# Patient Record
Sex: FEMALE | Race: WHITE | NOT HISPANIC OR LATINO | Employment: OTHER | URBAN - METROPOLITAN AREA
[De-identification: names, ages, dates, MRNs, and addresses within clinical notes are randomized per-mention and may not be internally consistent; named-entity substitution may affect disease eponyms.]

---

## 2018-02-18 ENCOUNTER — HOSPITAL ENCOUNTER (EMERGENCY)
Facility: HOSPITAL | Age: 48
Discharge: HOME | End: 2018-02-18
Attending: EMERGENCY MEDICINE | Admitting: EMERGENCY MEDICINE

## 2018-02-18 ENCOUNTER — APPOINTMENT (OUTPATIENT)
Dept: GENERAL RADIOLOGY | Facility: HOSPITAL | Age: 48
End: 2018-02-18

## 2018-02-18 VITALS
BODY MASS INDEX: 29.06 KG/M2 | HEIGHT: 63 IN | WEIGHT: 164 LBS | DIASTOLIC BLOOD PRESSURE: 71 MMHG | SYSTOLIC BLOOD PRESSURE: 97 MMHG | RESPIRATION RATE: 18 BRPM | OXYGEN SATURATION: 98 % | TEMPERATURE: 97.9 F | HEART RATE: 90 BPM

## 2018-02-18 DIAGNOSIS — J11.1 INFLUENZA: Primary | ICD-10-CM

## 2018-02-18 LAB
FLUAV AG NPH QL: NEGATIVE
FLUBV AG NPH QL IA: NEGATIVE

## 2018-02-18 PROCEDURE — 71046 X-RAY EXAM CHEST 2 VIEWS: CPT

## 2018-02-18 PROCEDURE — 99283 EMERGENCY DEPT VISIT LOW MDM: CPT

## 2018-02-18 PROCEDURE — 87804 INFLUENZA ASSAY W/OPTIC: CPT | Performed by: EMERGENCY MEDICINE

## 2018-02-18 RX ORDER — OSELTAMIVIR PHOSPHATE 75 MG/1
75 CAPSULE ORAL 2 TIMES DAILY
Qty: 10 CAPSULE | Refills: 0 | OUTPATIENT
Start: 2018-02-18 | End: 2019-04-28 | Stop reason: HOSPADM

## 2018-02-18 RX ORDER — DEXTROMETHORPHAN HYDROBROMIDE AND PROMETHAZINE HYDROCHLORIDE 15; 6.25 MG/5ML; MG/5ML
5 SYRUP ORAL 3 TIMES DAILY PRN
Qty: 60 ML | Refills: 0 | OUTPATIENT
Start: 2018-02-18 | End: 2019-04-28 | Stop reason: HOSPADM

## 2018-02-18 NOTE — ED PROVIDER NOTES
Subjective   Patient is a 48 y.o. female presenting with URI.   URI   Presenting symptoms: congestion, cough and fever    Severity:  Moderate  Onset quality:  Gradual  Duration:  1 day  Timing:  Constant  Progression:  Unchanged  Chronicity:  New  Relieved by:  Nothing  Worsened by:  Nothing  Ineffective treatments:  None tried  Associated symptoms: sinus pain    Risk factors: sick contacts        Review of Systems   Constitutional: Positive for fever.   HENT: Positive for congestion and sinus pain.    Respiratory: Positive for cough.    All other systems reviewed and are negative.      Past Medical History:   Diagnosis Date   • Cirrhosis    • Diabetes mellitus    • Disease of thyroid gland        Allergies   Allergen Reactions   • Penicillins Nausea And Vomiting   • Phenergan [Promethazine Hcl] Nausea And Vomiting   • Tramadol Hcl Nausea And Vomiting       Past Surgical History:   Procedure Laterality Date   • COLONOSCOPY     • HYSTERECTOMY         History reviewed. No pertinent family history.    Social History     Social History   • Marital status:      Spouse name: N/A   • Number of children: N/A   • Years of education: N/A     Social History Main Topics   • Smoking status: Current Every Day Smoker     Packs/day: 0.50     Types: Cigarettes   • Smokeless tobacco: Never Used   • Alcohol use No   • Drug use: No   • Sexual activity: Defer     Other Topics Concern   • None     Social History Narrative   • None           Objective   Physical Exam   Constitutional: She is oriented to person, place, and time. She appears well-developed and well-nourished.   HENT:   Head: Normocephalic and atraumatic.   Right Ear: External ear normal.   Left Ear: External ear normal.   Nose: Nose normal.   Mouth/Throat: Oropharynx is clear and moist.   Eyes: Conjunctivae and EOM are normal. Pupils are equal, round, and reactive to light.   Neck: Normal range of motion. Neck supple.   Cardiovascular: Normal rate, regular rhythm,  normal heart sounds and intact distal pulses.    Pulmonary/Chest: Effort normal and breath sounds normal.   Abdominal: Soft. Bowel sounds are normal.   Musculoskeletal: Normal range of motion.   Neurological: She is alert and oriented to person, place, and time.   Skin: Skin is warm and dry.   Psychiatric: She has a normal mood and affect. Her behavior is normal. Judgment and thought content normal.       Procedures         ED Course  ED Course   Comment By Time   Neg cxr. Well aware of the ss of worsening condition. All thankful and agreeable. DAVID Yanes 02/18 1622                  [facility]    Final diagnoses:   Influenza            DAVID Yanes  02/18/18 [id number]

## 2019-04-27 ENCOUNTER — APPOINTMENT (OUTPATIENT)
Dept: CT IMAGING | Facility: HOSPITAL | Age: 49
End: 2019-04-27

## 2019-04-27 ENCOUNTER — APPOINTMENT (OUTPATIENT)
Dept: GENERAL RADIOLOGY | Facility: HOSPITAL | Age: 49
End: 2019-04-27

## 2019-04-27 ENCOUNTER — HOSPITAL ENCOUNTER (EMERGENCY)
Facility: HOSPITAL | Age: 49
Discharge: HOME | End: 2019-04-28
Attending: EMERGENCY MEDICINE | Admitting: EMERGENCY MEDICINE

## 2019-04-27 DIAGNOSIS — K80.20 CALCULUS OF GALLBLADDER WITHOUT CHOLECYSTITIS WITHOUT OBSTRUCTION: ICD-10-CM

## 2019-04-27 DIAGNOSIS — R73.9 HYPERGLYCEMIA: ICD-10-CM

## 2019-04-27 DIAGNOSIS — R07.9 CHEST PAIN, UNSPECIFIED TYPE: ICD-10-CM

## 2019-04-27 DIAGNOSIS — R10.13 EPIGASTRIC PAIN: Primary | ICD-10-CM

## 2019-04-27 DIAGNOSIS — K74.60 CIRRHOSIS OF LIVER WITHOUT ASCITES, UNSPECIFIED HEPATIC CIRRHOSIS TYPE (HCC): ICD-10-CM

## 2019-04-27 LAB
ALBUMIN SERPL-MCNC: 4.5 G/DL (ref 3.5–5.2)
ALBUMIN/GLOB SERPL: 1.2 G/DL
ALP SERPL-CCNC: 144 U/L (ref 39–117)
ALT SERPL W P-5'-P-CCNC: 31 U/L (ref 1–33)
ANION GAP SERPL CALCULATED.3IONS-SCNC: 13 MMOL/L
AST SERPL-CCNC: 36 U/L (ref 1–32)
B-OH-BUTYR SERPL-SCNC: 0.12 MMOL/L (ref 0.02–0.27)
BACTERIA UR QL AUTO: NORMAL /HPF
BASOPHILS # BLD AUTO: 0.01 10*3/MM3 (ref 0–0.2)
BASOPHILS NFR BLD AUTO: 0.3 % (ref 0–1.5)
BILIRUB SERPL-MCNC: 0.7 MG/DL (ref 0.2–1.2)
BILIRUB UR QL STRIP: NEGATIVE
BUN BLD-MCNC: 18 MG/DL (ref 6–20)
BUN/CREAT SERPL: 24.7 (ref 7–25)
CALCIUM SPEC-SCNC: 9.7 MG/DL (ref 8.6–10.5)
CHLORIDE SERPL-SCNC: 94 MMOL/L (ref 98–107)
CLARITY UR: CLEAR
CO2 SERPL-SCNC: 26 MMOL/L (ref 22–29)
COLOR UR: YELLOW
CREAT BLD-MCNC: 0.73 MG/DL (ref 0.57–1)
D DIMER PPP FEU-MCNC: 0.48 MCGFEU/ML (ref 0–0.56)
DEPRECATED RDW RBC AUTO: 40.9 FL (ref 37–54)
EOSINOPHIL # BLD AUTO: 0.06 10*3/MM3 (ref 0–0.4)
EOSINOPHIL NFR BLD AUTO: 2 % (ref 0.3–6.2)
ERYTHROCYTE [DISTWIDTH] IN BLOOD BY AUTOMATED COUNT: 13 % (ref 12.3–15.4)
GFR SERPL CREATININE-BSD FRML MDRD: 85 ML/MIN/1.73
GLOBULIN UR ELPH-MCNC: 3.7 GM/DL
GLUCOSE BLD-MCNC: 450 MG/DL (ref 65–99)
GLUCOSE BLDC GLUCOMTR-MCNC: 342 MG/DL (ref 70–130)
GLUCOSE UR STRIP-MCNC: ABNORMAL MG/DL
HCT VFR BLD AUTO: 38.4 % (ref 34–46.6)
HGB BLD-MCNC: 13.6 G/DL (ref 12–15.9)
HGB UR QL STRIP.AUTO: ABNORMAL
HOLD SPECIMEN: NORMAL
HOLD SPECIMEN: NORMAL
HYALINE CASTS UR QL AUTO: NORMAL /LPF
IMM GRANULOCYTES # BLD AUTO: 0 10*3/MM3 (ref 0–0.05)
IMM GRANULOCYTES NFR BLD AUTO: 0 % (ref 0–0.5)
KETONES UR QL STRIP: NEGATIVE
LEUKOCYTE ESTERASE UR QL STRIP.AUTO: NEGATIVE
LIPASE SERPL-CCNC: 77 U/L (ref 13–60)
LYMPHOCYTES # BLD AUTO: 0.73 10*3/MM3 (ref 0.7–3.1)
LYMPHOCYTES NFR BLD AUTO: 23.9 % (ref 19.6–45.3)
MCH RBC QN AUTO: 30.5 PG (ref 26.6–33)
MCHC RBC AUTO-ENTMCNC: 35.4 G/DL (ref 31.5–35.7)
MCV RBC AUTO: 86.1 FL (ref 79–97)
MONOCYTES # BLD AUTO: 0.2 10*3/MM3 (ref 0.1–0.9)
MONOCYTES NFR BLD AUTO: 6.6 % (ref 5–12)
NEUTROPHILS # BLD AUTO: 2.05 10*3/MM3 (ref 1.7–7)
NEUTROPHILS NFR BLD AUTO: 67.2 % (ref 42.7–76)
NITRITE UR QL STRIP: NEGATIVE
NT-PROBNP SERPL-MCNC: 164.7 PG/ML (ref 5–450)
PH UR STRIP.AUTO: 7.5 [PH] (ref 5–8)
PLATELET # BLD AUTO: 75 10*3/MM3 (ref 140–450)
PMV BLD AUTO: 12.9 FL (ref 6–12)
POTASSIUM BLD-SCNC: 4.6 MMOL/L (ref 3.5–5.2)
PROT SERPL-MCNC: 8.2 G/DL (ref 6–8.5)
PROT UR QL STRIP: ABNORMAL
RBC # BLD AUTO: 4.46 10*6/MM3 (ref 3.77–5.28)
RBC # UR: NORMAL /HPF
REF LAB TEST METHOD: NORMAL
SODIUM BLD-SCNC: 133 MMOL/L (ref 136–145)
SP GR UR STRIP: 1.02 (ref 1–1.03)
SQUAMOUS #/AREA URNS HPF: NORMAL /HPF
TROPONIN T SERPL-MCNC: <0.01 NG/ML (ref 0–0.03)
UROBILINOGEN UR QL STRIP: ABNORMAL
WBC NRBC COR # BLD: 3.05 10*3/MM3 (ref 3.4–10.8)
WBC UR QL AUTO: NORMAL /HPF
WHOLE BLOOD HOLD SPECIMEN: NORMAL
WHOLE BLOOD HOLD SPECIMEN: NORMAL

## 2019-04-27 PROCEDURE — 25010000002 IOPAMIDOL 61 % SOLUTION: Performed by: EMERGENCY MEDICINE

## 2019-04-27 PROCEDURE — 85025 COMPLETE CBC W/AUTO DIFF WBC: CPT | Performed by: EMERGENCY MEDICINE

## 2019-04-27 PROCEDURE — 83690 ASSAY OF LIPASE: CPT | Performed by: EMERGENCY MEDICINE

## 2019-04-27 PROCEDURE — 84484 ASSAY OF TROPONIN QUANT: CPT | Performed by: EMERGENCY MEDICINE

## 2019-04-27 PROCEDURE — 74177 CT ABD & PELVIS W/CONTRAST: CPT

## 2019-04-27 PROCEDURE — 99284 EMERGENCY DEPT VISIT MOD MDM: CPT

## 2019-04-27 PROCEDURE — 93005 ELECTROCARDIOGRAM TRACING: CPT | Performed by: EMERGENCY MEDICINE

## 2019-04-27 PROCEDURE — 80053 COMPREHEN METABOLIC PANEL: CPT | Performed by: EMERGENCY MEDICINE

## 2019-04-27 PROCEDURE — 85379 FIBRIN DEGRADATION QUANT: CPT | Performed by: NURSE PRACTITIONER

## 2019-04-27 PROCEDURE — 93005 ELECTROCARDIOGRAM TRACING: CPT

## 2019-04-27 PROCEDURE — 71045 X-RAY EXAM CHEST 1 VIEW: CPT

## 2019-04-27 PROCEDURE — 82010 KETONE BODYS QUAN: CPT | Performed by: NURSE PRACTITIONER

## 2019-04-27 PROCEDURE — 82962 GLUCOSE BLOOD TEST: CPT

## 2019-04-27 PROCEDURE — 81001 URINALYSIS AUTO W/SCOPE: CPT | Performed by: NURSE PRACTITIONER

## 2019-04-27 PROCEDURE — 83880 ASSAY OF NATRIURETIC PEPTIDE: CPT | Performed by: EMERGENCY MEDICINE

## 2019-04-27 RX ORDER — ASPIRIN 81 MG/1
324 TABLET, CHEWABLE ORAL ONCE
Status: COMPLETED | OUTPATIENT
Start: 2019-04-27 | End: 2019-04-27

## 2019-04-27 RX ORDER — SODIUM CHLORIDE 0.9 % (FLUSH) 0.9 %
10 SYRINGE (ML) INJECTION AS NEEDED
Status: DISCONTINUED | OUTPATIENT
Start: 2019-04-27 | End: 2019-04-28 | Stop reason: HOSPADM

## 2019-04-27 RX ADMIN — ASPIRIN 81 MG 324 MG: 81 TABLET ORAL at 20:37

## 2019-04-27 RX ADMIN — SODIUM CHLORIDE 1000 ML: 9 INJECTION, SOLUTION INTRAVENOUS at 21:12

## 2019-04-27 RX ADMIN — IOPAMIDOL 90 ML: 612 INJECTION, SOLUTION INTRAVENOUS at 23:28

## 2019-04-28 VITALS
SYSTOLIC BLOOD PRESSURE: 102 MMHG | RESPIRATION RATE: 16 BRPM | TEMPERATURE: 98.5 F | HEIGHT: 63 IN | WEIGHT: 162 LBS | BODY MASS INDEX: 28.7 KG/M2 | HEART RATE: 84 BPM | DIASTOLIC BLOOD PRESSURE: 82 MMHG | OXYGEN SATURATION: 99 %

## 2019-05-01 ENCOUNTER — HOSPITAL ENCOUNTER (OUTPATIENT)
Dept: CARDIOLOGY | Facility: HOSPITAL | Age: 49
Discharge: HOME | End: 2019-05-01

## 2019-05-01 ENCOUNTER — LAB (OUTPATIENT)
Dept: LAB | Facility: HOSPITAL | Age: 49
End: 2019-05-01

## 2019-05-01 ENCOUNTER — OFFICE VISIT (OUTPATIENT)
Dept: CARDIOLOGY | Facility: HOSPITAL | Age: 49
End: 2019-05-01

## 2019-05-01 VITALS
HEIGHT: 63 IN | OXYGEN SATURATION: 100 % | WEIGHT: 165 LBS | BODY MASS INDEX: 29.23 KG/M2 | TEMPERATURE: 97.5 F | HEART RATE: 78 BPM | DIASTOLIC BLOOD PRESSURE: 64 MMHG | SYSTOLIC BLOOD PRESSURE: 97 MMHG | RESPIRATION RATE: 16 BRPM

## 2019-05-01 DIAGNOSIS — K80.20 CALCULUS OF GALLBLADDER WITHOUT CHOLECYSTITIS WITHOUT OBSTRUCTION: ICD-10-CM

## 2019-05-01 DIAGNOSIS — R07.9 CHEST PAIN, UNSPECIFIED TYPE: ICD-10-CM

## 2019-05-01 DIAGNOSIS — Z82.49 FAMILY HISTORY OF PREMATURE CAD: ICD-10-CM

## 2019-05-01 DIAGNOSIS — E78.2 MIXED HYPERLIPIDEMIA: ICD-10-CM

## 2019-05-01 DIAGNOSIS — R00.2 PALPITATIONS: ICD-10-CM

## 2019-05-01 DIAGNOSIS — R06.09 DOE (DYSPNEA ON EXERTION): ICD-10-CM

## 2019-05-01 DIAGNOSIS — K74.60 NON-ALCOHOLIC CIRRHOSIS (HCC): ICD-10-CM

## 2019-05-01 DIAGNOSIS — R07.9 CHEST PAIN, UNSPECIFIED TYPE: Primary | ICD-10-CM

## 2019-05-01 PROBLEM — E11.9 DIABETES MELLITUS (HCC): Status: ACTIVE | Noted: 2019-05-01

## 2019-05-01 PROBLEM — E11.9 DIABETES MELLITUS (HCC): Status: RESOLVED | Noted: 2019-05-01 | Resolved: 2019-05-01

## 2019-05-01 PROBLEM — Z79.4 TYPE 2 DIABETES MELLITUS, WITH LONG-TERM CURRENT USE OF INSULIN (HCC): Status: RESOLVED | Noted: 2019-05-01 | Resolved: 2019-05-01

## 2019-05-01 LAB
BH CV STRESS BP STAGE 2: NORMAL
BH CV STRESS BP STAGE 4: NORMAL
BH CV STRESS COMMENTS STAGE 1: NORMAL
BH CV STRESS DOSE REGADENOSON STAGE 1: 0.4
BH CV STRESS DURATION MIN STAGE 1: 1
BH CV STRESS DURATION MIN STAGE 2: 1
BH CV STRESS DURATION MIN STAGE 3: 1
BH CV STRESS DURATION MIN STAGE 4: 1
BH CV STRESS DURATION SEC STAGE 1: 0
BH CV STRESS DURATION SEC STAGE 2: 0
BH CV STRESS DURATION SEC STAGE 3: 0
BH CV STRESS DURATION SEC STAGE 4: 0
BH CV STRESS HR STAGE 1: 80
BH CV STRESS HR STAGE 2: 103
BH CV STRESS HR STAGE 3: 99
BH CV STRESS HR STAGE 4: 96
BH CV STRESS PROTOCOL 1: NORMAL
BH CV STRESS RECOVERY BP: NORMAL MMHG
BH CV STRESS RECOVERY HR: 92 BPM
BH CV STRESS STAGE 1: 1
BH CV STRESS STAGE 2: 2
BH CV STRESS STAGE 3: 3
BH CV STRESS STAGE 4: 4
CHOLEST SERPL-MCNC: 201 MG/DL (ref 0–200)
HDLC SERPL-MCNC: 28 MG/DL (ref 40–60)
LDLC SERPL CALC-MCNC: 139 MG/DL (ref 0–100)
LDLC/HDLC SERPL: 4.95 {RATIO}
LV EF NUC BP: 75 %
MAXIMAL PREDICTED HEART RATE: 172 BPM
PERCENT MAX PREDICTED HR: 60.47 %
STRESS BASELINE BP: NORMAL MMHG
STRESS BASELINE HR: 66 BPM
STRESS PERCENT HR: 71 %
STRESS POST PEAK BP: NORMAL MMHG
STRESS POST PEAK HR: 104 BPM
STRESS TARGET HR: 146 BPM
TRIGL SERPL-MCNC: 172 MG/DL (ref 0–150)
VLDLC SERPL-MCNC: 34.4 MG/DL (ref 5–40)

## 2019-05-01 PROCEDURE — 25010000002 REGADENOSON 0.4 MG/5ML SOLUTION: Performed by: NURSE PRACTITIONER

## 2019-05-01 PROCEDURE — 78452 HT MUSCLE IMAGE SPECT MULT: CPT

## 2019-05-01 PROCEDURE — 93017 CV STRESS TEST TRACING ONLY: CPT

## 2019-05-01 PROCEDURE — A9500 TC99M SESTAMIBI: HCPCS | Performed by: NURSE PRACTITIONER

## 2019-05-01 PROCEDURE — 0 TECHNETIUM SESTAMIBI: Performed by: NURSE PRACTITIONER

## 2019-05-01 PROCEDURE — 99214 OFFICE O/P EST MOD 30 MIN: CPT | Performed by: NURSE PRACTITIONER

## 2019-05-01 PROCEDURE — 80061 LIPID PANEL: CPT

## 2019-05-01 PROCEDURE — 93018 CV STRESS TEST I&R ONLY: CPT | Performed by: INTERNAL MEDICINE

## 2019-05-01 PROCEDURE — 78452 HT MUSCLE IMAGE SPECT MULT: CPT | Performed by: INTERNAL MEDICINE

## 2019-05-01 PROCEDURE — 36415 COLL VENOUS BLD VENIPUNCTURE: CPT

## 2019-05-01 RX ORDER — INSULIN DETEMIR 100 [IU]/ML
40 INJECTION, SOLUTION SUBCUTANEOUS 2 TIMES DAILY
Refills: 5 | COMMUNITY
Start: 2019-03-15

## 2019-05-01 RX ORDER — ASPIRIN 81 MG/1
81 TABLET ORAL DAILY
Qty: 30 TABLET | Refills: 3 | Status: SHIPPED | OUTPATIENT
Start: 2019-05-01 | End: 2020-10-23

## 2019-05-01 RX ORDER — RANITIDINE 300 MG/1
300 TABLET ORAL
Refills: 1 | COMMUNITY
Start: 2019-03-15 | End: 2020-10-23

## 2019-05-01 RX ORDER — LEVOTHYROXINE SODIUM 0.15 MG/1
150 TABLET ORAL DAILY
Refills: 1 | COMMUNITY
Start: 2019-03-15

## 2019-05-01 RX ORDER — MELOXICAM 7.5 MG/1
7.5 TABLET ORAL DAILY
Refills: 5 | COMMUNITY
Start: 2019-03-18 | End: 2020-10-23

## 2019-05-01 RX ORDER — GABAPENTIN 300 MG/1
600 CAPSULE ORAL
Refills: 2 | COMMUNITY
Start: 2019-03-30

## 2019-05-01 RX ADMIN — REGADENOSON 0.4 MG: 0.08 INJECTION, SOLUTION INTRAVENOUS at 14:43

## 2019-05-01 RX ADMIN — TECHNETIUM TC 99M SESTAMIBI 1 DOSE: 1 INJECTION INTRAVENOUS at 14:45

## 2019-05-01 RX ADMIN — TECHNETIUM TC 99M SESTAMIBI 1 DOSE: 1 INJECTION INTRAVENOUS at 13:10

## 2019-05-01 NOTE — PROGRESS NOTES
[facility]  Heart and Valve Center      Encounter Date:05/01/2019     Shira Sanders  [address] [facility] [address]  [unfilled]    1970    Rodrigo Cho MD Lolucien DANILES [place] is a 49 y.o. female.      Subjective:     Chief Complaint:  Chest Pain (ED visit)       HPI     49-year-old female presented to [facility] ED with complaints of chest pain.  Substernal chest pain that radiated through upper back.  Pain began approximately 4 days prior to ED visit with gradual worsening.  Described as a heavy feeling.  Associated nausea, abdominal pain, palpitations, shortness of breath.  Denies vomiting, diarrhea, diaphoresis.  Current every day smoker.  History of cirrhosis.  Denies alcohol or drug abuse.  Patient is a diabetic requiring insulin.  History of hypothyroidism on Synthroid, takes propranolol for palpitations.  CT of the abdomen completed during ED visit showing cholelithiasis as well as cirrhotic liver.  Patient scheduled follow-up for GI as well as continued evaluation in the chest pain clinic and the heart valve center due to cardiac risk factors. Mother MI age 40.  No hx of cardiac testing.     Chest discomfort has continued. Noted at rest but worse with exertion.  JEFFERY has been progressive over several weeks.  No dizziness, syncope, near syncope, PND, orthopnea, edema.     Cardiac risk factors:  History of  Diabetes (insulin dependent), HLP (untreated)  Tobacco use, sedentary lifestyle  Family history of premature coronary artery disease (male < 55 yrs, female <66 yrs)    Patient Active Problem List    Diagnosis Date Noted   • Non-alcoholic cirrhosis (CMS/HCC) 05/01/2019   • Palpitations 05/01/2019   • Calculus of gallbladder without cholecystitis without obstruction 05/01/2019   • Mixed hyperlipidemia 05/01/2019           Past Surgical History:   Procedure Laterality Date   • COLONOSCOPY     • HYSTERECTOMY         Allergies   Allergen Reactions   • Chlorhexidine Rash   •  Penicillins Nausea And Vomiting   • Phenergan [Promethazine Hcl] Nausea And Vomiting   • Tramadol Hcl Nausea And Vomiting         Current Outpatient Medications:   •  gabapentin (NEURONTIN) 300 MG capsule, Take 300 mg by mouth every night at bedtime., Disp: , Rfl: 2  •  insulin aspart (novoLOG) 100 UNIT/ML injection, Inject  under the skin 3 (Three) Times a Day Before Meals. Sliding scale, Disp: , Rfl:   •  LEVEMIR 100 UNIT/ML injection, Inject 40 Units under the skin into the appropriate area as directed 2 (Two) Times a Day., Disp: , Rfl: 5  •  levothyroxine (SYNTHROID, LEVOTHROID) 150 MCG tablet, Take 150 mcg by mouth Daily., Disp: , Rfl: 1  •  meloxicam (MOBIC) 7.5 MG tablet, Take 7.5 mg by mouth Daily., Disp: , Rfl: 5  •  mupirocin (BACTROBAN) 2 % ointment, Apply 1 application topically to the appropriate area as directed 2 (Two) Times a Day As Needed., Disp: , Rfl: 1  •  omeprazole (PriLOSEC) 20 MG capsule, Take  by mouth., Disp: , Rfl:   •  propranolol (INDERAL) 20 MG tablet, Take  by mouth., Disp: , Rfl:   •  raNITIdine (ZANTAC) 300 MG tablet, Take 300 mg by mouth every night at bedtime., Disp: , Rfl: 1  •  rifaximin (XIFAXAN) 550 MG tablet, Take  by mouth., Disp: , Rfl:   •  aspirin 81 MG EC tablet, Take 1 tablet by mouth Daily., Disp: 30 tablet, Rfl: 3    The following portions of the patient's history were reviewed and updated today during office visit as appropriate: allergies, current medications, past family history, past medical history, past social history, past surgical history and problem list.    Review of Systems   Cardiovascular: Positive for chest pain, dyspnea on exertion and palpitations.   Gastrointestinal: Positive for abdominal pain and nausea.   All other systems reviewed and are negative.      Objective:     Vitals:    05/01/19 0942 05/01/19 0945 05/01/19 0947   BP: 141/67 100/57 97/64   BP Location: Right arm Left arm Left arm   Patient Position: Sitting Sitting Standing   Pulse: 71  78  "  Resp: 16     Temp: 97.5 °F (36.4 °C)     TempSrc: Temporal     SpO2: 100%     Weight: 74.8 kg (165 lb)     Height: 160 cm (62.99\")           Physical Exam   Constitutional: She is oriented to person, place, and time. She appears well-developed and well-nourished. No distress.   HENT:   Head: Normocephalic and atraumatic.   Mouth/Throat: Oropharynx is clear and moist.   Eyes: Conjunctivae are normal. Pupils are equal, round, and reactive to light. No scleral icterus.   Neck: No hepatojugular reflux and no JVD present. Carotid bruit is not present. No tracheal deviation present. No thyromegaly present.   Cardiovascular: Normal rate, regular rhythm, normal heart sounds and intact distal pulses. Exam reveals no friction rub.   No murmur heard.  Pulmonary/Chest: Effort normal and breath sounds normal.   Abdominal: Soft. Bowel sounds are normal. She exhibits no distension. There is no tenderness.   Musculoskeletal: She exhibits no edema.   Lymphadenopathy:     She has no cervical adenopathy.   Neurological: She is alert and oriented to person, place, and time.   Skin: Skin is warm, dry and intact. No rash noted. No cyanosis or erythema. No pallor.   Psychiatric: She has a normal mood and affect. Her behavior is normal. Thought content normal.   Vitals reviewed.      Lab and Diagnostic Review:  Admission on 04/27/2019, Discharged on 04/28/2019   Component Date Value Ref Range Status   • Troponin T 04/27/2019 <0.010  0.000 - 0.030 ng/mL Final   • Glucose 04/27/2019 450* 65 - 99 mg/dL Final   • BUN 04/27/2019 18  6 - 20 mg/dL Final   • Creatinine 04/27/2019 0.73  0.57 - 1.00 mg/dL Final   • Sodium 04/27/2019 133* 136 - 145 mmol/L Final   • Potassium 04/27/2019 4.6  3.5 - 5.2 mmol/L Final   • Chloride 04/27/2019 94* 98 - 107 mmol/L Final   • CO2 04/27/2019 26.0  22.0 - 29.0 mmol/L Final   • Calcium 04/27/2019 9.7  8.6 - 10.5 mg/dL Final   • Total Protein 04/27/2019 8.2  6.0 - 8.5 g/dL Final   • Albumin 04/27/2019 4.50  3.50 " - 5.20 g/dL Final   • ALT (SGPT) 04/27/2019 31  1 - 33 U/L Final   • AST (SGOT) 04/27/2019 36* 1 - 32 U/L Final   • Alkaline Phosphatase 04/27/2019 144* 39 - 117 U/L Final   • Total Bilirubin 04/27/2019 0.7  0.2 - 1.2 mg/dL Final   • eGFR Non African Amer 04/27/2019 85  >60 mL/min/1.73 Final   • Globulin 04/27/2019 3.7  gm/dL Final   • A/G Ratio 04/27/2019 1.2  g/dL Final   • BUN/Creatinine Ratio 04/27/2019 24.7  7.0 - 25.0 Final   • Anion Gap 04/27/2019 13.0  mmol/L Final   • Lipase 04/27/2019 77* 13 - 60 U/L Final   • proBNP 04/27/2019 164.7  5.0 - 450.0 pg/mL Final   • Extra Tube 04/27/2019 hold for add-on   Final    Auto resulted   • Extra Tube 04/27/2019 Hold for add-ons.   Final    Auto resulted.   • Extra Tube 04/27/2019 hold for add-on   Final    Auto resulted   • Extra Tube 04/27/2019 Hold for add-ons.   Final    Auto resulted.   • WBC 04/27/2019 3.05* 3.40 - 10.80 10*3/mm3 Final   • RBC 04/27/2019 4.46  3.77 - 5.28 10*6/mm3 Final   • Hemoglobin 04/27/2019 13.6  12.0 - 15.9 g/dL Final   • Hematocrit 04/27/2019 38.4  34.0 - 46.6 % Final   • MCV 04/27/2019 86.1  79.0 - 97.0 fL Final   • MCH 04/27/2019 30.5  26.6 - 33.0 pg Final   • MCHC 04/27/2019 35.4  31.5 - 35.7 g/dL Final   • RDW 04/27/2019 13.0  12.3 - 15.4 % Final   • RDW-SD 04/27/2019 40.9  37.0 - 54.0 fl Final   • MPV 04/27/2019 12.9* 6.0 - 12.0 fL Final   • Platelets 04/27/2019 75* 140 - 450 10*3/mm3 Final   • Neutrophil % 04/27/2019 67.2  42.7 - 76.0 % Final   • Lymphocyte % 04/27/2019 23.9  19.6 - 45.3 % Final   • Monocyte % 04/27/2019 6.6  5.0 - 12.0 % Final   • Eosinophil % 04/27/2019 2.0  0.3 - 6.2 % Final   • Basophil % 04/27/2019 0.3  0.0 - 1.5 % Final   • Immature Grans % 04/27/2019 0.0  0.0 - 0.5 % Final   • Neutrophils, Absolute 04/27/2019 2.05  1.70 - 7.00 10*3/mm3 Final   • Lymphocytes, Absolute 04/27/2019 0.73  0.70 - 3.10 10*3/mm3 Final   • Monocytes, Absolute 04/27/2019 0.20  0.10 - 0.90 10*3/mm3 Final   • Eosinophils, Absolute  04/27/2019 0.06  0.00 - 0.40 10*3/mm3 Final   • Basophils, Absolute 04/27/2019 0.01  0.00 - 0.20 10*3/mm3 Final   • Immature Grans, Absolute 04/27/2019 0.00  0.00 - 0.05 10*3/mm3 Final   • D-Dimer, Quantitative 04/27/2019 0.48  0.00 - 0.56 MCGFEU/mL Final   • Color, UA 04/27/2019 Yellow  Yellow, Straw Final   • Appearance, UA 04/27/2019 Clear  Clear Final   • pH, UA 04/27/2019 7.5  5.0 - 8.0 Final   • Specific Gravity, UA 04/27/2019 1.025  1.001 - 1.030 Final   • Glucose, UA 04/27/2019 >=1000 mg/dL (3+)* Negative Final   • Ketones, UA 04/27/2019 Negative  Negative Final   • Bilirubin, UA 04/27/2019 Negative  Negative Final   • Blood, UA 04/27/2019 Small (1+)* Negative Final   • Protein, UA 04/27/2019 30 mg/dL (1+)* Negative Final   • Leuk Esterase, UA 04/27/2019 Negative  Negative Final   • Nitrite, UA 04/27/2019 Negative  Negative Final   • Urobilinogen, UA 04/27/2019 0.2 E.U./dL  0.2 - 1.0 E.U./dL Final   • Beta-Hydroxybutyrate Quant 04/27/2019 0.121  0.020 - 0.270 mmol/L Final   • RBC, UA 04/27/2019 0-2  None Seen, 0-2 /HPF Final   • WBC, UA 04/27/2019 0-2  None Seen, 0-2 /HPF Final   • Bacteria, UA 04/27/2019 None Seen  None Seen, Trace /HPF Final   • Squamous Epithelial Cells, UA 04/27/2019 None Seen  None Seen, 0-2 /HPF Final   • Hyaline Casts, UA 04/27/2019 None Seen  0 - 6 /LPF Final   • Methodology 04/27/2019 Manual Light Microscopy   Final   • Glucose 04/27/2019 342* 70 - 130 mg/dL Final       Lab Results   Component Value Date    CHLPL 240 (H) 07/29/2014    TRIG 304 (H) 07/29/2014    HDL 35 (L) 07/29/2014     (H) 07/29/2014         Assessment and Plan:         1. 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 JEFFERY (dyspnea on exertion)  Tobacco use, sedentary lifestyle  ? Anginal equiv.  - Stress Test With Myocardial Perfusion (1 Day);  Future  - Ambulatory Referral to Cardiology    3. Palpitations  BB  - Ambulatory Referral to Cardiology    4. Mixed hyperlipidemia  No statin due to cirrosis  - Stress Test With Myocardial Perfusion (1 Day); Future    5. Family history of premature CAD  Mother MI age 40    6. Non-alcoholic cirrhosis (CMS/HCC)      7. Calculus of gallbladder without cholecystitis without obstruction  Referral to GI    F/u with [facility] for cardiac risk factor management, May need cardiac clearance.     *Please note that portions of this note were completed with a voice recognition program. Efforts were made to edit the dictations, but occasionally words are mistranscribed.

## 2019-05-02 ENCOUNTER — TELEPHONE (OUTPATIENT)
Dept: CARDIOLOGY | Facility: HOSPITAL | Age: 49
End: 2019-05-02

## 2019-05-02 NOTE — TELEPHONE ENCOUNTER
Called and reviewed stress test results and lab results.  Lipid abnormal.     Pt with hx of non-alcoholic cirrhosis not on statin.    F/u with GI and cardiology as scheduled.

## 2020-10-09 ENCOUNTER — TRANSCRIBE ORDERS (OUTPATIENT)
Dept: INTERVENTIONAL RADIOLOGY/VASCULAR | Facility: HOSPITAL | Age: 50
End: 2020-10-09

## 2020-10-09 DIAGNOSIS — R80.8 OTHER PROTEINURIA: Primary | ICD-10-CM

## 2020-10-20 ENCOUNTER — APPOINTMENT (OUTPATIENT)
Dept: PREADMISSION TESTING | Facility: HOSPITAL | Age: 50
End: 2020-10-20

## 2020-10-20 PROCEDURE — C9803 HOPD COVID-19 SPEC COLLECT: HCPCS

## 2020-10-20 PROCEDURE — U0004 COV-19 TEST NON-CDC HGH THRU: HCPCS

## 2020-10-21 LAB — SARS-COV-2 RNA RESP QL NAA+PROBE: NOT DETECTED

## 2020-10-23 ENCOUNTER — HOSPITAL ENCOUNTER (OUTPATIENT)
Dept: CT IMAGING | Facility: HOSPITAL | Age: 50
Discharge: HOME | End: 2020-10-23
Admitting: RADIOLOGY

## 2020-10-23 VITALS
HEIGHT: 63 IN | HEART RATE: 80 BPM | SYSTOLIC BLOOD PRESSURE: 109 MMHG | RESPIRATION RATE: 18 BRPM | BODY MASS INDEX: 29.59 KG/M2 | TEMPERATURE: 96.9 F | WEIGHT: 167 LBS | OXYGEN SATURATION: 98 % | DIASTOLIC BLOOD PRESSURE: 79 MMHG

## 2020-10-23 DIAGNOSIS — R80.8 OTHER PROTEINURIA: ICD-10-CM

## 2020-10-23 LAB
APTT PPP: 25.3 SECONDS (ref 50–95)
BASOPHILS # BLD AUTO: 0.03 10*3/MM3 (ref 0–0.2)
BASOPHILS NFR BLD AUTO: 0.8 % (ref 0–1.5)
DEPRECATED RDW RBC AUTO: 38.7 FL (ref 37–54)
EOSINOPHIL # BLD AUTO: 0.04 10*3/MM3 (ref 0–0.4)
EOSINOPHIL NFR BLD AUTO: 1 % (ref 0.3–6.2)
ERYTHROCYTE [DISTWIDTH] IN BLOOD BY AUTOMATED COUNT: 12.8 % (ref 12.3–15.4)
GLUCOSE BLDC GLUCOMTR-MCNC: 332 MG/DL (ref 70–130)
GLUCOSE BLDC GLUCOMTR-MCNC: 367 MG/DL (ref 70–130)
GLUCOSE BLDC GLUCOMTR-MCNC: 386 MG/DL (ref 70–130)
HCT VFR BLD AUTO: 32.3 % (ref 34–46.6)
HGB BLD-MCNC: 11 G/DL (ref 12–15.9)
IMM GRANULOCYTES # BLD AUTO: 0.01 10*3/MM3 (ref 0–0.05)
IMM GRANULOCYTES NFR BLD AUTO: 0.3 % (ref 0–0.5)
INR PPP: 1.06 (ref 0.85–1.16)
LYMPHOCYTES # BLD AUTO: 0.52 10*3/MM3 (ref 0.7–3.1)
LYMPHOCYTES NFR BLD AUTO: 13.4 % (ref 19.6–45.3)
MCH RBC QN AUTO: 28.1 PG (ref 26.6–33)
MCHC RBC AUTO-ENTMCNC: 34.1 G/DL (ref 31.5–35.7)
MCV RBC AUTO: 82.6 FL (ref 79–97)
MONOCYTES # BLD AUTO: 0.22 10*3/MM3 (ref 0.1–0.9)
MONOCYTES NFR BLD AUTO: 5.7 % (ref 5–12)
NEUTROPHILS NFR BLD AUTO: 3.06 10*3/MM3 (ref 1.7–7)
NEUTROPHILS NFR BLD AUTO: 78.8 % (ref 42.7–76)
NRBC BLD AUTO-RTO: 0 /100 WBC (ref 0–0.2)
PLATELET # BLD AUTO: 70 10*3/MM3 (ref 140–450)
PMV BLD AUTO: 12.9 FL (ref 6–12)
PROTHROMBIN TIME: 13.5 SECONDS (ref 11.5–14)
RBC # BLD AUTO: 3.91 10*6/MM3 (ref 3.77–5.28)
WBC # BLD AUTO: 3.88 10*3/MM3 (ref 3.4–10.8)

## 2020-10-23 PROCEDURE — 88348 ELECTRON MICROSCOPY DX: CPT | Performed by: INTERNAL MEDICINE

## 2020-10-23 PROCEDURE — 77012 CT SCAN FOR NEEDLE BIOPSY: CPT

## 2020-10-23 PROCEDURE — 85730 THROMBOPLASTIN TIME PARTIAL: CPT | Performed by: RADIOLOGY

## 2020-10-23 PROCEDURE — 88313 SPECIAL STAINS GROUP 2: CPT | Performed by: INTERNAL MEDICINE

## 2020-10-23 PROCEDURE — 99152 MOD SED SAME PHYS/QHP 5/>YRS: CPT

## 2020-10-23 PROCEDURE — 88305 TISSUE EXAM BY PATHOLOGIST: CPT | Performed by: INTERNAL MEDICINE

## 2020-10-23 PROCEDURE — 88350 IMFLUOR EA ADDL 1ANTB STN PX: CPT | Performed by: INTERNAL MEDICINE

## 2020-10-23 PROCEDURE — 25010000002 MIDAZOLAM PER 1 MG: Performed by: RADIOLOGY

## 2020-10-23 PROCEDURE — 25010000002 FENTANYL CITRATE (PF) 100 MCG/2ML SOLUTION: Performed by: RADIOLOGY

## 2020-10-23 PROCEDURE — 88346 IMFLUOR 1ST 1ANTB STAIN PX: CPT | Performed by: INTERNAL MEDICINE

## 2020-10-23 PROCEDURE — 82962 GLUCOSE BLOOD TEST: CPT

## 2020-10-23 PROCEDURE — 85025 COMPLETE CBC W/AUTO DIFF WBC: CPT | Performed by: RADIOLOGY

## 2020-10-23 PROCEDURE — 25010000003 LIDOCAINE 1 % SOLUTION: Performed by: RADIOLOGY

## 2020-10-23 PROCEDURE — 85610 PROTHROMBIN TIME: CPT | Performed by: RADIOLOGY

## 2020-10-23 RX ORDER — PANTOPRAZOLE SODIUM 20 MG/1
20 TABLET, DELAYED RELEASE ORAL DAILY
COMMUNITY

## 2020-10-23 RX ORDER — SODIUM CHLORIDE 0.9 % (FLUSH) 0.9 %
3 SYRINGE (ML) INJECTION EVERY 12 HOURS SCHEDULED
Status: DISCONTINUED | OUTPATIENT
Start: 2020-10-23 | End: 2020-10-24 | Stop reason: HOSPADM

## 2020-10-23 RX ORDER — LIDOCAINE HYDROCHLORIDE 10 MG/ML
20 INJECTION, SOLUTION INFILTRATION; PERINEURAL ONCE
Status: COMPLETED | OUTPATIENT
Start: 2020-10-23 | End: 2020-10-23

## 2020-10-23 RX ORDER — FENTANYL CITRATE 50 UG/ML
INJECTION, SOLUTION INTRAMUSCULAR; INTRAVENOUS
Status: COMPLETED | OUTPATIENT
Start: 2020-10-23 | End: 2020-10-23

## 2020-10-23 RX ORDER — MIDAZOLAM HYDROCHLORIDE 1 MG/ML
INJECTION INTRAMUSCULAR; INTRAVENOUS
Status: COMPLETED | OUTPATIENT
Start: 2020-10-23 | End: 2020-10-23

## 2020-10-23 RX ORDER — SODIUM CHLORIDE 0.9 % (FLUSH) 0.9 %
10 SYRINGE (ML) INJECTION AS NEEDED
Status: DISCONTINUED | OUTPATIENT
Start: 2020-10-23 | End: 2020-10-24 | Stop reason: HOSPADM

## 2020-10-23 RX ORDER — FENTANYL CITRATE 50 UG/ML
INJECTION, SOLUTION INTRAMUSCULAR; INTRAVENOUS
Status: DISPENSED
Start: 2020-10-23 | End: 2020-10-23

## 2020-10-23 RX ORDER — MIDAZOLAM HYDROCHLORIDE 1 MG/ML
INJECTION INTRAMUSCULAR; INTRAVENOUS
Status: DISPENSED
Start: 2020-10-23 | End: 2020-10-23

## 2020-10-23 RX ADMIN — FENTANYL CITRATE 50 MCG: 50 INJECTION, SOLUTION INTRAMUSCULAR; INTRAVENOUS at 11:36

## 2020-10-23 RX ADMIN — LIDOCAINE HYDROCHLORIDE 20 ML: 10 INJECTION, SOLUTION INFILTRATION; PERINEURAL at 11:51

## 2020-10-23 RX ADMIN — MIDAZOLAM 1 MG: 1 INJECTION INTRAMUSCULAR; INTRAVENOUS at 11:36

## 2020-10-23 RX ADMIN — MIDAZOLAM 1 MG: 1 INJECTION INTRAMUSCULAR; INTRAVENOUS at 11:44

## 2020-10-23 RX ADMIN — FENTANYL CITRATE 50 MCG: 50 INJECTION, SOLUTION INTRAMUSCULAR; INTRAVENOUS at 11:44

## 2020-10-23 NOTE — NURSING NOTE
Lt renal biopsy completed and pt tolerated well. Band aid dressing Lt flank clean, dry, and intact. Tolerating post bedrest activity, walking in room. Vss. Given printed and oral discharge instructions. Verbalizes understanding. Discharged per wheelchair to front entrance with family driving.

## 2020-10-23 NOTE — NURSING NOTE
Renal biopsy performed by Dr Watts.  Pt tolerated well.  Bedrest orders placed.  Report called to EDITA

## 2020-10-23 NOTE — POST-PROCEDURE NOTE
An immediate patient assessment was done prior to the administration of moderate and/or deep conscious sedation.      Shira Sanders      Pre-op Diagnosis:   CKD2/Proteinuria  Post-op diagnosis:  CKD2/Proteinuria      Anesthesia: Moderate sedation    ASA SCALE ASSESSMENT:  2-Mild to moderate systemic disease, medically well controlled, with no functional limitation    MALLAMPATI CLASSIFICATION:  2-Able to visualize the soft palate, fauces, uvula. The anterior & posterior tonsilar pillars are hidden by the tongue.    Staff:   Martinez Watts MD      Estimated Blood Loss: Trace    Urine Voided: * No values recorded between 10/23/2020 12:00 AM and 10/23/2020  4:31 PM *    Specimens:                18 gauge cores. 2 intact and one fragment      Drains:  None    Findings: Intact left kidney    Complications: No immediate      Martinez Watts MD     Date: 10/23/2020  Time: 16:31 EDT

## 2020-10-26 ENCOUNTER — TELEPHONE (OUTPATIENT)
Dept: INFUSION THERAPY | Facility: HOSPITAL | Age: 50
End: 2020-10-26

## 2020-11-16 LAB
LAB AP CASE REPORT: NORMAL
PATH REPORT.FINAL DX SPEC: NORMAL

## 2021-05-17 ENCOUNTER — TRANSCRIBE ORDERS (OUTPATIENT)
Dept: LAB | Facility: HOSPITAL | Age: 51
End: 2021-05-17

## 2021-05-17 ENCOUNTER — LAB (OUTPATIENT)
Dept: LAB | Facility: HOSPITAL | Age: 51
End: 2021-05-17

## 2021-05-17 DIAGNOSIS — N18.2 CHRONIC KIDNEY DISEASE, STAGE II (MILD): ICD-10-CM

## 2021-05-17 DIAGNOSIS — N18.2 CHRONIC KIDNEY DISEASE, STAGE II (MILD): Primary | ICD-10-CM

## 2021-05-17 LAB
ALBUMIN SERPL-MCNC: 3.6 G/DL (ref 3.5–5.2)
ANION GAP SERPL CALCULATED.3IONS-SCNC: 15.2 MMOL/L (ref 5–15)
BACTERIA UR QL AUTO: NORMAL /HPF
BILIRUB UR QL STRIP: NEGATIVE
BUN SERPL-MCNC: 50 MG/DL (ref 6–20)
BUN/CREAT SERPL: 21.6 (ref 7–25)
CALCIUM SPEC-SCNC: 9.9 MG/DL (ref 8.6–10.5)
CHLORIDE SERPL-SCNC: 91 MMOL/L (ref 98–107)
CLARITY UR: CLEAR
CO2 SERPL-SCNC: 18.8 MMOL/L (ref 22–29)
COLOR UR: YELLOW
CREAT SERPL-MCNC: 2.31 MG/DL (ref 0.57–1)
GFR SERPL CREATININE-BSD FRML MDRD: 22 ML/MIN/1.73
GLUCOSE SERPL-MCNC: 300 MG/DL (ref 65–99)
GLUCOSE UR STRIP-MCNC: ABNORMAL MG/DL
HGB UR QL STRIP.AUTO: NEGATIVE
HYALINE CASTS UR QL AUTO: NORMAL /LPF
KETONES UR QL STRIP: NEGATIVE
LEUKOCYTE ESTERASE UR QL STRIP.AUTO: NEGATIVE
NITRITE UR QL STRIP: NEGATIVE
PH UR STRIP.AUTO: 6.5 [PH] (ref 5–8)
PHOSPHATE SERPL-MCNC: 6.4 MG/DL (ref 2.5–4.5)
POTASSIUM SERPL-SCNC: 5.5 MMOL/L (ref 3.5–5.2)
PROT UR QL STRIP: ABNORMAL
RBC # UR: NORMAL /HPF
REF LAB TEST METHOD: NORMAL
SODIUM SERPL-SCNC: 125 MMOL/L (ref 136–145)
SP GR UR STRIP: 1.01 (ref 1–1.03)
SQUAMOUS #/AREA URNS HPF: NORMAL /HPF
UROBILINOGEN UR QL STRIP: ABNORMAL
WBC UR QL AUTO: NORMAL /HPF

## 2021-05-17 PROCEDURE — 80069 RENAL FUNCTION PANEL: CPT

## 2021-05-17 PROCEDURE — 36415 COLL VENOUS BLD VENIPUNCTURE: CPT

## 2021-05-17 PROCEDURE — 81001 URINALYSIS AUTO W/SCOPE: CPT

## 2021-06-02 ENCOUNTER — APPOINTMENT (OUTPATIENT)
Dept: GENERAL RADIOLOGY | Facility: HOSPITAL | Age: 51
End: 2021-06-02

## 2021-06-02 ENCOUNTER — HOSPITAL ENCOUNTER (EMERGENCY)
Facility: HOSPITAL | Age: 51
Discharge: LEFT BEFORE DISCHARGE | End: 2021-06-02

## 2021-06-02 VITALS
SYSTOLIC BLOOD PRESSURE: 133 MMHG | RESPIRATION RATE: 22 BRPM | HEIGHT: 63 IN | OXYGEN SATURATION: 98 % | HEART RATE: 80 BPM | WEIGHT: 167 LBS | TEMPERATURE: 98.6 F | BODY MASS INDEX: 29.59 KG/M2 | DIASTOLIC BLOOD PRESSURE: 49 MMHG

## 2021-06-02 LAB
ALBUMIN SERPL-MCNC: 3.2 G/DL (ref 3.5–5.2)
ALBUMIN/GLOB SERPL: 0.8 G/DL
ALP SERPL-CCNC: 322 U/L (ref 39–117)
ALT SERPL W P-5'-P-CCNC: 26 U/L (ref 1–33)
ANION GAP SERPL CALCULATED.3IONS-SCNC: 9 MMOL/L (ref 5–15)
AST SERPL-CCNC: 47 U/L (ref 1–32)
BASOPHILS # BLD AUTO: 0.01 10*3/MM3 (ref 0–0.2)
BASOPHILS NFR BLD AUTO: 0.2 % (ref 0–1.5)
BILIRUB SERPL-MCNC: 0.9 MG/DL (ref 0–1.2)
BUN SERPL-MCNC: 27 MG/DL (ref 6–20)
BUN/CREAT SERPL: 18.1 (ref 7–25)
CALCIUM SPEC-SCNC: 9.1 MG/DL (ref 8.6–10.5)
CHLORIDE SERPL-SCNC: 98 MMOL/L (ref 98–107)
CO2 SERPL-SCNC: 20 MMOL/L (ref 22–29)
CREAT SERPL-MCNC: 1.49 MG/DL (ref 0.57–1)
DEPRECATED RDW RBC AUTO: 39.1 FL (ref 37–54)
EOSINOPHIL # BLD AUTO: 0.05 10*3/MM3 (ref 0–0.4)
EOSINOPHIL NFR BLD AUTO: 1.1 % (ref 0.3–6.2)
ERYTHROCYTE [DISTWIDTH] IN BLOOD BY AUTOMATED COUNT: 12.8 % (ref 12.3–15.4)
GFR SERPL CREATININE-BSD FRML MDRD: 37 ML/MIN/1.73
GLOBULIN UR ELPH-MCNC: 3.9 GM/DL
GLUCOSE SERPL-MCNC: 372 MG/DL (ref 65–99)
HCT VFR BLD AUTO: 26.4 % (ref 34–46.6)
HGB BLD-MCNC: 8.8 G/DL (ref 12–15.9)
HOLD SPECIMEN: NORMAL
IMM GRANULOCYTES # BLD AUTO: 0.01 10*3/MM3 (ref 0–0.05)
IMM GRANULOCYTES NFR BLD AUTO: 0.2 % (ref 0–0.5)
LIPASE SERPL-CCNC: 195 U/L (ref 13–60)
LYMPHOCYTES # BLD AUTO: 0.24 10*3/MM3 (ref 0.7–3.1)
LYMPHOCYTES NFR BLD AUTO: 5.3 % (ref 19.6–45.3)
MCH RBC QN AUTO: 28.4 PG (ref 26.6–33)
MCHC RBC AUTO-ENTMCNC: 33.3 G/DL (ref 31.5–35.7)
MCV RBC AUTO: 85.2 FL (ref 79–97)
MONOCYTES # BLD AUTO: 0.26 10*3/MM3 (ref 0.1–0.9)
MONOCYTES NFR BLD AUTO: 5.8 % (ref 5–12)
NEUTROPHILS NFR BLD AUTO: 3.93 10*3/MM3 (ref 1.7–7)
NEUTROPHILS NFR BLD AUTO: 87.4 % (ref 42.7–76)
NRBC BLD AUTO-RTO: 0 /100 WBC (ref 0–0.2)
NT-PROBNP SERPL-MCNC: 875.2 PG/ML (ref 0–900)
PLATELET # BLD AUTO: 87 10*3/MM3 (ref 140–450)
PMV BLD AUTO: 14 FL (ref 6–12)
POTASSIUM SERPL-SCNC: 5.7 MMOL/L (ref 3.5–5.2)
PROT SERPL-MCNC: 7.1 G/DL (ref 6–8.5)
RBC # BLD AUTO: 3.1 10*6/MM3 (ref 3.77–5.28)
SODIUM SERPL-SCNC: 127 MMOL/L (ref 136–145)
TROPONIN T SERPL-MCNC: 0.04 NG/ML (ref 0–0.03)
WBC # BLD AUTO: 4.5 10*3/MM3 (ref 3.4–10.8)
WHOLE BLOOD HOLD SPECIMEN: NORMAL
WHOLE BLOOD HOLD SPECIMEN: NORMAL

## 2021-06-02 PROCEDURE — 99211 OFF/OP EST MAY X REQ PHY/QHP: CPT

## 2021-06-02 PROCEDURE — 83690 ASSAY OF LIPASE: CPT

## 2021-06-02 PROCEDURE — 93005 ELECTROCARDIOGRAM TRACING: CPT

## 2021-06-02 PROCEDURE — 80053 COMPREHEN METABOLIC PANEL: CPT

## 2021-06-02 PROCEDURE — 85025 COMPLETE CBC W/AUTO DIFF WBC: CPT

## 2021-06-02 PROCEDURE — 83880 ASSAY OF NATRIURETIC PEPTIDE: CPT

## 2021-06-02 PROCEDURE — 84484 ASSAY OF TROPONIN QUANT: CPT

## 2021-06-02 RX ORDER — SODIUM CHLORIDE 0.9 % (FLUSH) 0.9 %
10 SYRINGE (ML) INJECTION AS NEEDED
Status: DISCONTINUED | OUTPATIENT
Start: 2021-06-02 | End: 2021-06-02 | Stop reason: HOSPADM

## 2021-06-03 ENCOUNTER — APPOINTMENT (OUTPATIENT)
Dept: CT IMAGING | Facility: HOSPITAL | Age: 51
End: 2021-06-03

## 2021-06-03 ENCOUNTER — TELEPHONE (OUTPATIENT)
Dept: EMERGENCY DEPT | Facility: HOSPITAL | Age: 51
End: 2021-06-03

## 2021-06-03 ENCOUNTER — APPOINTMENT (OUTPATIENT)
Dept: GENERAL RADIOLOGY | Facility: HOSPITAL | Age: 51
End: 2021-06-03

## 2021-06-03 ENCOUNTER — HOSPITAL ENCOUNTER (EMERGENCY)
Facility: HOSPITAL | Age: 51
Discharge: ANOTHER HOSPITAL | End: 2021-06-04
Attending: EMERGENCY MEDICINE | Admitting: EMERGENCY MEDICINE

## 2021-06-03 DIAGNOSIS — F17.200 TOBACCO DEPENDENCE: ICD-10-CM

## 2021-06-03 DIAGNOSIS — R18.8 OTHER ASCITES: ICD-10-CM

## 2021-06-03 DIAGNOSIS — K80.20 GALLSTONES: ICD-10-CM

## 2021-06-03 DIAGNOSIS — Z86.39 HISTORY OF DIABETES MELLITUS: ICD-10-CM

## 2021-06-03 DIAGNOSIS — N28.9 ACUTE RENAL INSUFFICIENCY: ICD-10-CM

## 2021-06-03 DIAGNOSIS — K75.81 NASH (NONALCOHOLIC STEATOHEPATITIS): ICD-10-CM

## 2021-06-03 DIAGNOSIS — K82.8 THICKENING OF WALL OF GALLBLADDER WITH PERICHOLECYSTIC FLUID: ICD-10-CM

## 2021-06-03 DIAGNOSIS — D64.9 ANEMIA, UNSPECIFIED TYPE: ICD-10-CM

## 2021-06-03 DIAGNOSIS — K35.20 ACUTE APPENDICITIS WITH GENERALIZED PERITONITIS, WITHOUT GANGRENE OR ABSCESS, UNSPECIFIED WHETHER PERFORATION PRESENT: Primary | ICD-10-CM

## 2021-06-03 DIAGNOSIS — R77.8 ELEVATED TROPONIN: ICD-10-CM

## 2021-06-03 DIAGNOSIS — D69.6 THROMBOCYTOPENIA (HCC): ICD-10-CM

## 2021-06-03 LAB
ALBUMIN SERPL-MCNC: 3.1 G/DL (ref 3.5–5.2)
ALBUMIN/GLOB SERPL: 0.9 G/DL
ALP SERPL-CCNC: 295 U/L (ref 39–117)
ALT SERPL W P-5'-P-CCNC: 24 U/L (ref 1–33)
AMMONIA BLD-SCNC: 44 UMOL/L (ref 11–51)
ANION GAP SERPL CALCULATED.3IONS-SCNC: 8 MMOL/L (ref 5–15)
AST SERPL-CCNC: 45 U/L (ref 1–32)
BASOPHILS # BLD AUTO: 0.01 10*3/MM3 (ref 0–0.2)
BASOPHILS NFR BLD AUTO: 0.3 % (ref 0–1.5)
BILIRUB SERPL-MCNC: 0.7 MG/DL (ref 0–1.2)
BUN SERPL-MCNC: 26 MG/DL (ref 6–20)
BUN/CREAT SERPL: 17.8 (ref 7–25)
CALCIUM SPEC-SCNC: 9 MG/DL (ref 8.6–10.5)
CHLORIDE SERPL-SCNC: 110 MMOL/L (ref 98–107)
CO2 SERPL-SCNC: 20 MMOL/L (ref 22–29)
CREAT SERPL-MCNC: 1.46 MG/DL (ref 0.57–1)
CRP SERPL-MCNC: 0.66 MG/DL (ref 0–0.5)
D-LACTATE SERPL-SCNC: 1.8 MMOL/L (ref 0.5–2)
DEPRECATED RDW RBC AUTO: 41 FL (ref 37–54)
DEVELOPER EXPIRATION DATE: 0
DEVELOPER LOT NUMBER: 0
EOSINOPHIL # BLD AUTO: 0.08 10*3/MM3 (ref 0–0.4)
EOSINOPHIL NFR BLD AUTO: 2.2 % (ref 0.3–6.2)
ERYTHROCYTE [DISTWIDTH] IN BLOOD BY AUTOMATED COUNT: 13.1 % (ref 12.3–15.4)
EXPIRATION DATE: NORMAL
FECAL OCCULT BLOOD SCREEN, POC: NEGATIVE
FLUAV RNA RESP QL NAA+PROBE: NOT DETECTED
FLUBV RNA RESP QL NAA+PROBE: NOT DETECTED
FOLATE SERPL-MCNC: 12.7 NG/ML (ref 4.78–24.2)
GFR SERPL CREATININE-BSD FRML MDRD: 38 ML/MIN/1.73
GLOBULIN UR ELPH-MCNC: 3.6 GM/DL
GLUCOSE SERPL-MCNC: 106 MG/DL (ref 65–99)
HCT VFR BLD AUTO: 25.8 % (ref 34–46.6)
HGB BLD-MCNC: 8.5 G/DL (ref 12–15.9)
HOLD SPECIMEN: NORMAL
IMM GRANULOCYTES # BLD AUTO: 0.01 10*3/MM3 (ref 0–0.05)
IMM GRANULOCYTES NFR BLD AUTO: 0.3 % (ref 0–0.5)
IRON 24H UR-MRATE: 32 MCG/DL (ref 37–145)
IRON SATN MFR SERPL: 6 % (ref 20–50)
LIPASE SERPL-CCNC: 151 U/L (ref 13–60)
LYMPHOCYTES # BLD AUTO: 0.37 10*3/MM3 (ref 0.7–3.1)
LYMPHOCYTES NFR BLD AUTO: 10.3 % (ref 19.6–45.3)
Lab: NORMAL
MCH RBC QN AUTO: 28.4 PG (ref 26.6–33)
MCHC RBC AUTO-ENTMCNC: 32.9 G/DL (ref 31.5–35.7)
MCV RBC AUTO: 86.3 FL (ref 79–97)
MONOCYTES # BLD AUTO: 0.33 10*3/MM3 (ref 0.1–0.9)
MONOCYTES NFR BLD AUTO: 9.2 % (ref 5–12)
NEGATIVE CONTROL: NEGATIVE
NEUTROPHILS NFR BLD AUTO: 2.79 10*3/MM3 (ref 1.7–7)
NEUTROPHILS NFR BLD AUTO: 77.7 % (ref 42.7–76)
NRBC BLD AUTO-RTO: 0 /100 WBC (ref 0–0.2)
NT-PROBNP SERPL-MCNC: 673.3 PG/ML (ref 0–900)
PLATELET # BLD AUTO: 83 10*3/MM3 (ref 140–450)
PMV BLD AUTO: 13.9 FL (ref 6–12)
POSITIVE CONTROL: POSITIVE
POTASSIUM SERPL-SCNC: 4.4 MMOL/L (ref 3.5–5.2)
PROCALCITONIN SERPL-MCNC: 0.39 NG/ML (ref 0–0.25)
PROT SERPL-MCNC: 6.7 G/DL (ref 6–8.5)
QT INTERVAL: 358 MS
QT INTERVAL: 376 MS
QTC INTERVAL: 423 MS
QTC INTERVAL: 439 MS
RBC # BLD AUTO: 2.99 10*6/MM3 (ref 3.77–5.28)
RETICS # AUTO: 0.07 10*6/MM3 (ref 0.02–0.13)
RETICS/RBC NFR AUTO: 2.29 % (ref 0.7–1.9)
SARS-COV-2 RNA RESP QL NAA+PROBE: NOT DETECTED
SODIUM SERPL-SCNC: 138 MMOL/L (ref 136–145)
TIBC SERPL-MCNC: 496 MCG/DL (ref 298–536)
TRANSFERRIN SERPL-MCNC: 333 MG/DL (ref 200–360)
TROPONIN T SERPL-MCNC: 0.03 NG/ML (ref 0–0.03)
TROPONIN T SERPL-MCNC: 0.04 NG/ML (ref 0–0.03)
WBC # BLD AUTO: 3.59 10*3/MM3 (ref 3.4–10.8)
WHOLE BLOOD HOLD SPECIMEN: NORMAL
WHOLE BLOOD HOLD SPECIMEN: NORMAL

## 2021-06-03 PROCEDURE — 84466 ASSAY OF TRANSFERRIN: CPT | Performed by: PHYSICIAN ASSISTANT

## 2021-06-03 PROCEDURE — 83605 ASSAY OF LACTIC ACID: CPT | Performed by: PHYSICIAN ASSISTANT

## 2021-06-03 PROCEDURE — 84145 PROCALCITONIN (PCT): CPT | Performed by: PHYSICIAN ASSISTANT

## 2021-06-03 PROCEDURE — 93005 ELECTROCARDIOGRAM TRACING: CPT | Performed by: EMERGENCY MEDICINE

## 2021-06-03 PROCEDURE — 83540 ASSAY OF IRON: CPT | Performed by: PHYSICIAN ASSISTANT

## 2021-06-03 PROCEDURE — 84484 ASSAY OF TROPONIN QUANT: CPT | Performed by: EMERGENCY MEDICINE

## 2021-06-03 PROCEDURE — 84252 ASSAY OF VITAMIN B-2: CPT | Performed by: PHYSICIAN ASSISTANT

## 2021-06-03 PROCEDURE — 96365 THER/PROPH/DIAG IV INF INIT: CPT

## 2021-06-03 PROCEDURE — 83690 ASSAY OF LIPASE: CPT

## 2021-06-03 PROCEDURE — P9047 ALBUMIN (HUMAN), 25%, 50ML: HCPCS | Performed by: PHYSICIAN ASSISTANT

## 2021-06-03 PROCEDURE — 84484 ASSAY OF TROPONIN QUANT: CPT

## 2021-06-03 PROCEDURE — 93005 ELECTROCARDIOGRAM TRACING: CPT

## 2021-06-03 PROCEDURE — 82746 ASSAY OF FOLIC ACID SERUM: CPT | Performed by: PHYSICIAN ASSISTANT

## 2021-06-03 PROCEDURE — 74176 CT ABD & PELVIS W/O CONTRAST: CPT

## 2021-06-03 PROCEDURE — 82270 OCCULT BLOOD FECES: CPT | Performed by: PHYSICIAN ASSISTANT

## 2021-06-03 PROCEDURE — 83880 ASSAY OF NATRIURETIC PEPTIDE: CPT

## 2021-06-03 PROCEDURE — 71250 CT THORAX DX C-: CPT

## 2021-06-03 PROCEDURE — 25010000002 CEFTRIAXONE PER 250 MG: Performed by: PHYSICIAN ASSISTANT

## 2021-06-03 PROCEDURE — 87040 BLOOD CULTURE FOR BACTERIA: CPT | Performed by: PHYSICIAN ASSISTANT

## 2021-06-03 PROCEDURE — 87636 SARSCOV2 & INF A&B AMP PRB: CPT | Performed by: PHYSICIAN ASSISTANT

## 2021-06-03 PROCEDURE — 85025 COMPLETE CBC W/AUTO DIFF WBC: CPT

## 2021-06-03 PROCEDURE — 85045 AUTOMATED RETICULOCYTE COUNT: CPT | Performed by: PHYSICIAN ASSISTANT

## 2021-06-03 PROCEDURE — 99285 EMERGENCY DEPT VISIT HI MDM: CPT

## 2021-06-03 PROCEDURE — 96367 TX/PROPH/DG ADDL SEQ IV INF: CPT

## 2021-06-03 PROCEDURE — 82140 ASSAY OF AMMONIA: CPT | Performed by: PHYSICIAN ASSISTANT

## 2021-06-03 PROCEDURE — 80053 COMPREHEN METABOLIC PANEL: CPT

## 2021-06-03 PROCEDURE — 25010000002 ALBUMIN HUMAN 25% PER 50 ML: Performed by: PHYSICIAN ASSISTANT

## 2021-06-03 PROCEDURE — 86140 C-REACTIVE PROTEIN: CPT | Performed by: PHYSICIAN ASSISTANT

## 2021-06-03 PROCEDURE — 71045 X-RAY EXAM CHEST 1 VIEW: CPT

## 2021-06-03 RX ORDER — NICOTINE 21 MG/24HR
1 PATCH, TRANSDERMAL 24 HOURS TRANSDERMAL
Status: DISCONTINUED | OUTPATIENT
Start: 2021-06-03 | End: 2021-06-04 | Stop reason: HOSPADM

## 2021-06-03 RX ORDER — EMPAGLIFLOZIN 10 MG/1
10 TABLET, FILM COATED ORAL DAILY
COMMUNITY

## 2021-06-03 RX ORDER — ALBUMIN (HUMAN) 12.5 G/50ML
100 SOLUTION INTRAVENOUS ONCE
Status: COMPLETED | OUTPATIENT
Start: 2021-06-03 | End: 2021-06-04

## 2021-06-03 RX ORDER — SODIUM CHLORIDE 0.9 % (FLUSH) 0.9 %
10 SYRINGE (ML) INJECTION AS NEEDED
Status: DISCONTINUED | OUTPATIENT
Start: 2021-06-03 | End: 2021-06-04 | Stop reason: HOSPADM

## 2021-06-03 RX ORDER — INSULIN GLARGINE 100 [IU]/ML
66 INJECTION, SOLUTION SUBCUTANEOUS DAILY
COMMUNITY

## 2021-06-03 RX ORDER — NICOTINE 21 MG/24HR
1 PATCH, TRANSDERMAL 24 HOURS TRANSDERMAL
Status: DISCONTINUED | OUTPATIENT
Start: 2021-06-04 | End: 2021-06-03

## 2021-06-03 RX ADMIN — Medication 1 PATCH: at 23:05

## 2021-06-03 RX ADMIN — SODIUM CHLORIDE 2 G: 900 INJECTION INTRAVENOUS at 23:07

## 2021-06-03 RX ADMIN — ALBUMIN HUMAN 25 G: 0.25 SOLUTION INTRAVENOUS at 23:56

## 2021-06-03 NOTE — TELEPHONE ENCOUNTER
I called and spoke to the patient.  I advised her of her elevated troponin and that she needs to return to the emergency department for further evaluation.  She tells me she is still having some chest pressure at.  I did advise her to call the ambulance and return to the emergency department for further evaluation.  Patient was thankful for call.  When she got here I did advise her to say that Varghese Snow called her and that her heart damage test or troponin was elevated.

## 2021-06-03 NOTE — ED PROVIDER NOTES
"Subjective   This is a 51-year-old female that presents to the ER with onset of chest heaviness and discomfort that started yesterday around 3 PM.  Patient says discomfort is substernal as well as some upper back discomfort.  She describes pain as \"unbearable\" and heaviness.  She reports anorexia with nausea and shortness of breath.  She also reports diffuse abdominal pain with distention.  She has history of YOUNGER and is followed by Dr. De Leon, GI, and also tells me that she is on the transplant list at .  She utilizes Xifaxan, but she does not take lactulose.  Patient says that she usually has at least 1-2 bowel movements per day.  She does report some bright red blood per rectum for the last few weeks.  She reports that stools are hard and she strains and has history of external hemorrhoids.  Previous abdominal surgeries include hysterectomy.  Last colonoscopy was approximately 5 years ago and patient says that she is due to have one in the near future.  She denies any fever or chills.  She reports anorexia and nausea with dry heaves.  She also reports generalized malaise and fatigue.  Past medical history is significant for diabetes mellitus, insulin managed, YOUNGER, GERD, constipation, and hypothyroidism.  Patient was recently admitted to  and after discussion with Dr. De Leon on the phone, he said that renal failure is new.  Creatinine approximately 2 weeks ago was 2.31.  Patient left AMA from  because they did not allow her to go smoke.  Patient denies any dysuria, urgency, or frequency.  No other concerns at this time.      History provided by:  Patient  Chest Pain  Pain location:  Substernal area  Pain quality comment:  \"unbearable\" pain. Heaviness.  Pain radiates to:  Upper back (Straight through to her upper back.)  Onset quality:  Sudden  Duration:  28 hours  Timing:  Constant  Chronicity:  New  Relieved by:  Nothing  Worsened by:  Nothing  Ineffective treatments:  None tried  Associated symptoms: " anorexia, back pain, dizziness, fatigue, lower extremity edema, nausea, shortness of breath, vomiting (dry heaves.) and weakness (generalized weakness)    Associated symptoms: no abdominal pain, no cough, no diaphoresis, no fever, no heartburn, no near-syncope and no palpitations    Associated symptoms comment:  Bright red blood with stools x 2-3 months.  Hard stools with hemorrhoids.  History of YOUNGER. No blood thinners or NSAIDS.  Risk factors: diabetes mellitus and smoking    Risk factors comment:  Pt has YOUNGER and is followed by Dr. De Leon and transplant team at .  Positive family history of CAD.  Last colonoscopy was approx 5 years ago; normal.  Hematochezia x 2-3 months.      Review of Systems   Constitutional: Positive for activity change and fatigue. Negative for appetite change, chills, diaphoresis and fever.   HENT: Negative.    Respiratory: Positive for shortness of breath. Negative for cough and chest tightness.         Positive smoker.   Cardiovascular: Positive for chest pain. Negative for palpitations and near-syncope.   Gastrointestinal: Positive for abdominal distention, anorexia, blood in stool, constipation, nausea and vomiting (dry heaves.). Negative for abdominal pain and heartburn.        History of YOUNGER.   Genitourinary: Negative.    Musculoskeletal: Positive for back pain.   Neurological: Positive for dizziness and weakness (generalized weakness). Negative for syncope.   Psychiatric/Behavioral: Negative.  Negative for confusion.   All other systems reviewed and are negative.      Past Medical History:   Diagnosis Date   • Arthritis    • Chronic kidney disease    • Cirrhosis (CMS/HCC)    • Constipation    • Diabetes mellitus (CMS/HCC)    • Disease of thyroid gland    • GERD (gastroesophageal reflux disease)    • Low back pain        Allergies   Allergen Reactions   • Aspirin Other (See Comments)     Due to platelet issues was told not to take   • Chlorhexidine Rash   • Penicillins Nausea And  Vomiting   • Phenergan [Promethazine Hcl] Nausea And Vomiting   • Tramadol Hcl Nausea And Vomiting       Past Surgical History:   Procedure Laterality Date   • COLONOSCOPY     • HYSTERECTOMY     • LIVER BIOPSY         Family History   Problem Relation Age of Onset   • Heart failure Mother    • Heart attack Mother 40   • Heart failure Maternal Grandmother        Social History     Socioeconomic History   • Marital status: Legally      Spouse name: Not on file   • Number of children: Not on file   • Years of education: Not on file   • Highest education level: Not on file   Tobacco Use   • Smoking status: Current Every Day Smoker     Packs/day: 0.50     Types: Cigarettes   • Smokeless tobacco: Never Used   Substance and Sexual Activity   • Alcohol use: No   • Drug use: No   • Sexual activity: Defer           Objective   Physical Exam  Vitals and nursing note reviewed.   Constitutional:       Appearance: Normal appearance.   HENT:      Head: Normocephalic and atraumatic.      Right Ear: Tympanic membrane normal.      Left Ear: Tympanic membrane normal.      Nose: Nose normal.      Mouth/Throat:      Mouth: Mucous membranes are moist.      Pharynx: Oropharynx is clear.   Eyes:      Extraocular Movements: Extraocular movements intact.      Conjunctiva/sclera: Conjunctivae normal.      Pupils: Pupils are equal, round, and reactive to light.   Cardiovascular:      Rate and Rhythm: Normal rate and regular rhythm.  No extrasystoles are present.     Pulses: Normal pulses.      Heart sounds: Normal heart sounds.      Comments: Regular rate and rhythm.  No ectopy.  Trace pedal edema to lower extremities.  No erythema or warmth.  Pulmonary:      Effort: Pulmonary effort is normal.      Breath sounds: Normal breath sounds.      Comments: Lungs are clear to auscultation bilaterally.  Abdominal:      General: Bowel sounds are normal. There is distension.      Palpations: Abdomen is soft. There is hepatomegaly.       Tenderness: There is abdominal tenderness in the right upper quadrant, right lower quadrant, left upper quadrant and left lower quadrant. There is no right CVA tenderness, left CVA tenderness, guarding or rebound. Negative signs include Fernandez's sign and McBurney's sign.      Comments: Abdominal distention.  Active bowel sounds.  Diffuse tenderness noted.  Mild tenderness to right lower quadrant without guarding.  Moderate tenderness to left lower quadrant and right upper quadrant.  Mild tenderness to left upper quadrant.  No flank or CVA tenderness.  Abdominal exam is nonsurgical.   Musculoskeletal:         General: Normal range of motion.      Cervical back: Normal range of motion and neck supple.      Right lower leg: Edema present.      Left lower leg: Edema present.   Skin:     General: Skin is warm and dry.   Neurological:      General: No focal deficit present.      Mental Status: She is alert.         Critical Care  Performed by: Marcia Tracey PA-C  Authorized by: Charles Ku MD     Critical care provider statement:     Critical care time (minutes):  60    Critical care time was exclusive of:  Separately billable procedures and treating other patients    Critical care was necessary to treat or prevent imminent or life-threatening deterioration of the following conditions:  Hepatic failure (Acute appendicitis, as well as gallstones with pericholecystic fluid, on background of cirrhosis with ascites.)    Critical care was time spent personally by me on the following activities:  Blood draw for specimens, development of treatment plan with patient or surrogate, discussions with consultants, discussions with primary provider, evaluation of patient's response to treatment, examination of patient, obtaining history from patient or surrogate, review of old charts, re-evaluation of patient's condition, pulse oximetry, ordering and review of radiographic studies, ordering and review of laboratory studies and  ordering and performing treatments and interventions               ED Course  ED Course as of Jun 04 0405   Thu Jun 03, 2021 2130 Patient having diffuse abdominal tenderness, more in the left upper quadrant and left lower quadrant.  Radiologist just contacted me with findings of acute appendicitis.  The appendix is fluid-filled and there is inflammation consistent with appendicitis.  There is also the presence of gallstones with fluid around the gallbladder, as well.  Liver appears cirrhotic with some ascites.  Abdominal exam is nonsurgical at this time.  Patient has history of stage II chronic kidney disease.  BUN and creatinine are 26 and 1.46.  Initial troponin was 0.041 and repeat 2-hour troponin was 0.034.  Patient does report intermittent chest discomfort all across chest.  H&H is 8 and 25 with platelet count of 83,000.  Stool guaiac is negative.  Covid testing and influenza testing are negative, as well.    [FC]   2150 Discussed the case with Dr. Nelson, general surgeon, and with patient's history of ascites and cirrhosis, he recommended transfer to .  I paged  MDs and am awaiting discussion with surgery team there.    [FC]   2159 Discussed the case with  physician, Dr. Oviedo.  He reports that they are on their highest level of divert, and are even diverting some traumas.  They will not be able to accept patient in transfer, and he also said that just based on cirrhosis and evidence of appendicitis, that does not require transfer to .  I will page patient's on-call colorectal specialist, Dr. De Leon, to discuss the case, as well.    [FC]   2203 Discussed the case with Dr. De Leon.  Renal failure is not chronic per Dr. De Leon.  Creatinine 2 wks ago at  was 2.31, but patient left AMA. He recommended treatment to cover SBP with cephalosporin.  He also recommended paracentesis. She is a poor surgical candidate with 50% mortality rate.  He recommended Cefotaxime 2 grams q 8 hrs and Albumin 1.5 grams/kg IV  and paracentesis with cell count and differential.      [FC]   2215 I re-paged Dr. Nelson to discuss the case.  If he will not consult, may need to call [facility].  I will await Dr. Nelson's call-back.    [FC]   2300 Paged [facility] for possible transfer. Re-paged Dr. Nelson, and he said that patient will definitely have to be transferred to [facility] or another tertiary facility.    [FC]   2302 Discussed the case with Dr Jay Casiano from the [facility].     [FC]   2331 Dr. Casiano is going to discuss the case with  and will call me back.      [FC]   2332 Dr. Casiano from [facility] discussed the case with physician from  that felt like CT findings worked consistent with acute appendicitis since there was previous fluid-filled appendix on last CT scan.  Dr. Casiano then decided that our general surgeon needed to come and evaluate the patient and contact him directly if he felt that transfer was necessary.  I reevaluated patient's abdominal exam and she has tenderness to bilateral lower quadrants, left greater than right, as well as significant right upper quadrant tenderness.  Discussed course of action with Dr. Ku, ER attending physician, who is now going to see and evaluate the patient, as well.    [FC]   Fri Jun 04, 2021   0031 I have seen and examined this patient, too. She has been having increasing abdominal pain starting just a few days ago. Her abdominal exam finds maximum tenderness over the RUQ, less so the RLQ, and even less tenderness on the left abdomen. Abdominal wall is not rigid, there is no rebound.  Dr. Nelson was called. He will come in to examine the patient.    [LI]   0042 Dr. Nelson evaluated the patient and contacted Dr. Casiano at  and discussed need for transfer.  Dr. Casiano is agreeable to transfer and EMTALA will be completed.  Patient having increased pain after evaluation by general surgeon, Dr. Nelson.  I  ordered Morphine 2 mg IV for pain.  We will prepare patient for transfer.    [FC]      ED Course User Index  [FC] Marcia Tracey PA-C  [LI] Charles Ku MD                 Recent Results (from the past 24 hour(s))   ECG 12 Lead    Collection Time: 06/03/21  6:02 PM   Result Value Ref Range    QT Interval 358 ms    QTC Interval 423 ms   Troponin    Collection Time: 06/03/21  6:05 PM    Specimen: Blood   Result Value Ref Range    Troponin T 0.040 (C) 0.000 - 0.030 ng/mL   Comprehensive Metabolic Panel    Collection Time: 06/03/21  6:05 PM    Specimen: Blood   Result Value Ref Range    Glucose 106 (H) 65 - 99 mg/dL    BUN 26 (H) 6 - 20 mg/dL    Creatinine 1.46 (H) 0.57 - 1.00 mg/dL    Sodium 138 136 - 145 mmol/L    Potassium 4.4 3.5 - 5.2 mmol/L    Chloride 110 (H) 98 - 107 mmol/L    CO2 20.0 (L) 22.0 - 29.0 mmol/L    Calcium 9.0 8.6 - 10.5 mg/dL    Total Protein 6.7 6.0 - 8.5 g/dL    Albumin 3.10 (L) 3.50 - 5.20 g/dL    ALT (SGPT) 24 1 - 33 U/L    AST (SGOT) 45 (H) 1 - 32 U/L    Alkaline Phosphatase 295 (H) 39 - 117 U/L    Total Bilirubin 0.7 0.0 - 1.2 mg/dL    eGFR Non African Amer 38 (L) >60 mL/min/1.73    Globulin 3.6 gm/dL    A/G Ratio 0.9 g/dL    BUN/Creatinine Ratio 17.8 7.0 - 25.0    Anion Gap 8.0 5.0 - 15.0 mmol/L   Lipase    Collection Time: 06/03/21  6:05 PM    Specimen: Blood   Result Value Ref Range    Lipase 151 (H) 13 - 60 U/L   BNP    Collection Time: 06/03/21  6:05 PM    Specimen: Blood   Result Value Ref Range    proBNP 673.3 0.0 - 900.0 pg/mL   Light Blue Top    Collection Time: 06/03/21  6:05 PM   Result Value Ref Range    Extra Tube hold for add-on    Green Top (Gel)    Collection Time: 06/03/21  6:05 PM   Result Value Ref Range    Extra Tube Hold for add-ons.    Lavender Top    Collection Time: 06/03/21  6:05 PM   Result Value Ref Range    Extra Tube hold for add-on    Gold Top - SST    Collection Time: 06/03/21  6:05 PM   Result Value Ref Range    Extra Tube Hold for add-ons.    Gray Top     Collection Time: 06/03/21  6:05 PM   Result Value Ref Range    Extra Tube Hold for add-ons.    CBC Auto Differential    Collection Time: 06/03/21  6:05 PM    Specimen: Blood   Result Value Ref Range    WBC 3.59 3.40 - 10.80 10*3/mm3    RBC 2.99 (L) 3.77 - 5.28 10*6/mm3    Hemoglobin 8.5 (L) 12.0 - 15.9 g/dL    Hematocrit 25.8 (L) 34.0 - 46.6 %    MCV 86.3 79.0 - 97.0 fL    MCH 28.4 26.6 - 33.0 pg    MCHC 32.9 31.5 - 35.7 g/dL    RDW 13.1 12.3 - 15.4 %    RDW-SD 41.0 37.0 - 54.0 fl    MPV 13.9 (H) 6.0 - 12.0 fL    Platelets 83 (L) 140 - 450 10*3/mm3    Neutrophil % 77.7 (H) 42.7 - 76.0 %    Lymphocyte % 10.3 (L) 19.6 - 45.3 %    Monocyte % 9.2 5.0 - 12.0 %    Eosinophil % 2.2 0.3 - 6.2 %    Basophil % 0.3 0.0 - 1.5 %    Immature Grans % 0.3 0.0 - 0.5 %    Neutrophils, Absolute 2.79 1.70 - 7.00 10*3/mm3    Lymphocytes, Absolute 0.37 (L) 0.70 - 3.10 10*3/mm3    Monocytes, Absolute 0.33 0.10 - 0.90 10*3/mm3    Eosinophils, Absolute 0.08 0.00 - 0.40 10*3/mm3    Basophils, Absolute 0.01 0.00 - 0.20 10*3/mm3    Immature Grans, Absolute 0.01 0.00 - 0.05 10*3/mm3    nRBC 0.0 0.0 - 0.2 /100 WBC   C-reactive Protein    Collection Time: 06/03/21  6:05 PM    Specimen: Blood   Result Value Ref Range    C-Reactive Protein 0.66 (H) 0.00 - 0.50 mg/dL   Procalcitonin    Collection Time: 06/03/21  6:05 PM    Specimen: Blood   Result Value Ref Range    Procalcitonin 0.39 (H) 0.00 - 0.25 ng/mL   Reticulocytes    Collection Time: 06/03/21  6:05 PM    Specimen: Blood   Result Value Ref Range    Reticulocyte % 2.29 (H) 0.70 - 1.90 %    Reticulocyte Absolute 0.0687 0.0200 - 0.1300 10*6/mm3   Iron Profile    Collection Time: 06/03/21  6:05 PM    Specimen: Blood   Result Value Ref Range    Iron 32 (L) 37 - 145 mcg/dL    Iron Saturation 6 (L) 20 - 50 %    Transferrin 333 200 - 360 mg/dL    TIBC 496 298 - 536 mcg/dL   Folate    Collection Time: 06/03/21  6:05 PM    Specimen: Blood   Result Value Ref Range    Folate 12.70 4.78 - 24.20 ng/mL    COVID-19 and FLU A/B PCR - Swab, Nasopharynx    Collection Time: 06/03/21  8:07 PM    Specimen: Nasopharynx; Swab   Result Value Ref Range    COVID19 Not Detected Not Detected - Ref. Range    Influenza A PCR Not Detected Not Detected    Influenza B PCR Not Detected Not Detected   POC Occult Blood Stool    Collection Time: 06/03/21  8:08 PM    Specimen: Per Rectum; Stool   Result Value Ref Range    Fecal Occult Blood Negative Negative    Lot Number [id number]     Expiration Date 9-23     DEVELOPER LOT NUMBER 0     DEVELOPER EXPIRATION DATE 0     Positive Control Positive Positive    Negative Control Negative Negative   ECG 12 Lead    Collection Time: 06/03/21  8:08 PM   Result Value Ref Range    QT Interval 376 ms    QTC Interval 439 ms   Troponin    Collection Time: 06/03/21  8:20 PM    Specimen: Blood   Result Value Ref Range    Troponin T 0.034 (C) 0.000 - 0.030 ng/mL   Ammonia    Collection Time: 06/03/21  8:20 PM    Specimen: Blood   Result Value Ref Range    Ammonia 44 11 - 51 umol/L   Lactic Acid, Plasma    Collection Time: 06/03/21 10:58 PM    Specimen: Blood   Result Value Ref Range    Lactate 1.8 0.5 - 2.0 mmol/L     Note: In addition to lab results from this visit, the labs listed above may include labs taken at another facility or during a different encounter within the last 24 hours. Please correlate lab times with ED admission and discharge times for further clarification of the services performed during this visit.    CT Abdomen Pelvis Without Contrast   Final Result   1. Exam is limited due to lack of intravenous contrast. The appendix is now fluid-filled with surrounding fat stranding and has increased in dilation from prior. Findings are concerning for potential acute appendicitis although this is somewhat   indeterminate. Finding was discussed with Dr. Tracey at time of dictation.   2. There is cirrhosis with ascites.   3. There are gallstones with potential gallbladder wall thickening and  pericholecystic fluid. This may be accentuated due to ascites.   4. The patient's urinary bladder is distended. If the patient is not able to void spontaneously, consider catheterization.      Signer Name: Cherelle Song MD    Signed: 6/3/2021 9:38 PM    Workstation Name: [id number]     [facility]      CT Chest Without Contrast Diagnostic   Final Result   1. Exam is limited due to lack of intravenous contrast. The appendix is now fluid-filled with surrounding fat stranding and has increased in dilation from prior. Findings are concerning for potential acute appendicitis although this is somewhat   indeterminate. Finding was discussed with Dr. Tracey at time of dictation.   2. There is cirrhosis with ascites.   3. There are gallstones with potential gallbladder wall thickening and pericholecystic fluid. This may be accentuated due to ascites.   4. The patient's urinary bladder is distended. If the patient is not able to void spontaneously, consider catheterization.      Signer Name: Cherelle Song MD    Signed: 6/3/2021 9:38 PM    Workstation Name: [id number] [id number]     [facility]      XR Chest 1 View   Final Result   No evidence of acute disease in the chest.        This report was finalized on 6/3/2021 6:38 PM by Michael Serrano.            Vitals:    06/04/21 0315 06/04/21 0330 06/04/21 0345 06/04/21 0400   BP:  145/75  141/81   Pulse: 93  95 96   Resp:       Temp:       SpO2: 97% 100% 96% 94%   Weight:       Height:         Medications   sodium chloride 0.9 % flush 10 mL (has no administration in time range)   sodium chloride 0.9 % flush 10 mL (has no administration in time range)   nicotine (NICODERM CQ) 21 MG/24HR patch 1 patch (1 patch Transdermal Medication Applied 6/3/21 4655)   morphine injection 2 mg (has no administration in time range)   ondansetron (ZOFRAN) injection 4 mg (has no administration in time range)   cefTRIAXone (ROCEPHIN) 2 g/100 mL 0.9% NS IVPB (MBP) (0 g  Intravenous Stopped 6/3/21 2235)   albumin human 25 % IV SOLN 100 g (25 g Intravenous New Bag 6/4/21 0217)   morphine injection 2 mg (2 mg Intravenous Given 6/4/21 0118)   morphine injection 2 mg (2 mg Intravenous Given 6/4/21 0326)   ondansetron (ZOFRAN) injection 4 mg (4 mg Intravenous Given 6/4/21 0118)     ECG/EMG Results (last 24 hours)     Procedure Component Value Units Date/Time    ECG 12 Lead [[id number]] Collected: 06/03/21 1802     Updated: 06/03/21 2317     QT Interval 358 ms      QTC Interval 423 ms     Narrative:      Test Reason : chest pain  Blood Pressure :   */*   mmHG  Vent. Rate :  84 BPM     Atrial Rate :  84 BPM     P-R Int : 152 ms          QRS Dur :  80 ms      QT Int : 358 ms       P-R-T Axes :  52  53  78 degrees     QTc Int : 423 ms    Normal sinus rhythm  Normal ECG  When compared with ECG of 02-JUN-2021 19:19, (Unconfirmed)  No significant change was found  Confirmed by PRAVEENA RAND MD ([id number]) on 6/3/2021 11:17:32 PM    Referred By: EDMD           Confirmed By: PRAVEENA RAND MD    ECG 12 Lead [[id number]] Collected: 06/03/21 2008     Updated: 06/03/21 2317     QT Interval 376 ms      QTC Interval 439 ms     Narrative:      Test Reason : chest pain  Blood Pressure :   */*   mmHG  Vent. Rate :  82 BPM     Atrial Rate :  82 BPM     P-R Int : 156 ms          QRS Dur :  82 ms      QT Int : 376 ms       P-R-T Axes :  61  54  74 degrees     QTc Int : 439 ms    Normal sinus rhythm  Normal ECG  When compared with ECG of 03-JUN-2021 18:02, (Unconfirmed)  No significant change was found  Confirmed by PRAVEENA RAND MD ([id number]) on 6/3/2021 11:17:46 PM    Referred By: EDMD           Confirmed By: PRAVEENA RAND MD    ECG 12 Lead [[id number]] Collected: 06/04/21 0117     Updated: 06/04/21 0122        ECG 12 Lead         ECG 12 Lead   Final Result   Test Reason : chest pain   Blood Pressure :   */*   mmHG   Vent. Rate :  82 BPM     Atrial Rate :  82 BPM      P-R Int : 156 ms          QRS Dur :  82 ms       QT  Int : 376 ms       P-R-T Axes :  61  54  74 degrees      QTc Int : 439 ms      Normal sinus rhythm   Normal ECG   When compared with ECG of 03-JUN-2021 18:02, (Unconfirmed)   No significant change was found   Confirmed by PRAVEENA RAND MD ([id number]) on 6/3/2021 11:17:46 PM      Referred By: NAILA           Confirmed By: PRAVEENA RAND MD      ECG 12 Lead   Final Result   Test Reason : chest pain   Blood Pressure :   */*   mmHG   Vent. Rate :  84 BPM     Atrial Rate :  84 BPM      P-R Int : 152 ms          QRS Dur :  80 ms       QT Int : 358 ms       P-R-T Axes :  52  53  78 degrees      QTc Int : 423 ms      Normal sinus rhythm   Normal ECG   When compared with ECG of 02-JUN-2021 19:19, (Unconfirmed)   No significant change was found   Confirmed by PRAVEENA RAND MD ([id number]) on 6/3/2021 11:17:32 PM      Referred By: NAILA           Confirmed By: PRAVEENA RAND MD                                       [facility]    Final diagnoses:   Acute appendicitis with generalized peritonitis, without gangrene or abscess, unspecified whether perforation present   Thickening of wall of gallbladder with pericholecystic fluid   Gallstones   YOUNGER (nonalcoholic steatohepatitis)   Other ascites   Elevated troponin   Acute renal insufficiency   Anemia, unspecified type   Thrombocytopenia (CMS/HCC)   History of diabetes mellitus   Tobacco dependence       ED Disposition  ED Disposition     ED Disposition Condition Comment    Transfer to Another Facility   [facility]          No follow-up provider specified.       Medication List      No changes were made to your prescriptions during this visit.          Marcia Tracey PA-C  06/04/21 0127       Marcia Tracey PA-C  06/04/21 0405

## 2021-06-04 VITALS
SYSTOLIC BLOOD PRESSURE: 142 MMHG | RESPIRATION RATE: 18 BRPM | HEIGHT: 63 IN | OXYGEN SATURATION: 97 % | HEART RATE: 98 BPM | BODY MASS INDEX: 29.59 KG/M2 | DIASTOLIC BLOOD PRESSURE: 71 MMHG | TEMPERATURE: 99 F | WEIGHT: 167 LBS

## 2021-06-04 PROCEDURE — 25010000002 ONDANSETRON PER 1 MG

## 2021-06-04 PROCEDURE — 93005 ELECTROCARDIOGRAM TRACING: CPT | Performed by: EMERGENCY MEDICINE

## 2021-06-04 PROCEDURE — 96366 THER/PROPH/DIAG IV INF ADDON: CPT

## 2021-06-04 PROCEDURE — 25010000002 MORPHINE PER 10 MG: Performed by: EMERGENCY MEDICINE

## 2021-06-04 PROCEDURE — 25010000002 ALBUMIN HUMAN 25% PER 50 ML: Performed by: PHYSICIAN ASSISTANT

## 2021-06-04 PROCEDURE — 25010000002 ONDANSETRON PER 1 MG: Performed by: EMERGENCY MEDICINE

## 2021-06-04 PROCEDURE — 25010000002 MORPHINE PER 10 MG: Performed by: PHYSICIAN ASSISTANT

## 2021-06-04 PROCEDURE — P9047 ALBUMIN (HUMAN), 25%, 50ML: HCPCS | Performed by: PHYSICIAN ASSISTANT

## 2021-06-04 PROCEDURE — 96375 TX/PRO/DX INJ NEW DRUG ADDON: CPT

## 2021-06-04 PROCEDURE — 96376 TX/PRO/DX INJ SAME DRUG ADON: CPT

## 2021-06-04 RX ORDER — MORPHINE SULFATE 2 MG/ML
2 INJECTION, SOLUTION INTRAMUSCULAR; INTRAVENOUS ONCE
Status: COMPLETED | OUTPATIENT
Start: 2021-06-04 | End: 2021-06-04

## 2021-06-04 RX ORDER — ONDANSETRON 2 MG/ML
4 INJECTION INTRAMUSCULAR; INTRAVENOUS ONCE
Status: COMPLETED | OUTPATIENT
Start: 2021-06-04 | End: 2021-06-04

## 2021-06-04 RX ORDER — ONDANSETRON 2 MG/ML
INJECTION INTRAMUSCULAR; INTRAVENOUS
Status: COMPLETED
Start: 2021-06-04 | End: 2021-06-04

## 2021-06-04 RX ADMIN — ONDANSETRON 4 MG: 2 INJECTION INTRAMUSCULAR; INTRAVENOUS at 01:18

## 2021-06-04 RX ADMIN — MORPHINE SULFATE 2 MG: 2 INJECTION, SOLUTION INTRAMUSCULAR; INTRAVENOUS at 04:09

## 2021-06-04 RX ADMIN — MORPHINE SULFATE 2 MG: 2 INJECTION, SOLUTION INTRAMUSCULAR; INTRAVENOUS at 01:18

## 2021-06-04 RX ADMIN — ALBUMIN HUMAN 25 G: 0.25 SOLUTION INTRAVENOUS at 00:56

## 2021-06-04 RX ADMIN — ALBUMIN HUMAN 25 G: 0.25 SOLUTION INTRAVENOUS at 02:17

## 2021-06-04 RX ADMIN — ONDANSETRON 4 MG: 2 INJECTION INTRAMUSCULAR; INTRAVENOUS at 04:09

## 2021-06-04 RX ADMIN — MORPHINE SULFATE 2 MG: 2 INJECTION, SOLUTION INTRAMUSCULAR; INTRAVENOUS at 03:26

## 2021-06-04 NOTE — CONSULTS
General Surgery Consultation Note    Date of Service: 6/4/2021  YingJennifer Sanders  [id number]  1970      Referring Provider: Charles Ku MD    Location of Consult: ER     Reason for Consultation: Acute appendicitis       History of Present Illness:  I am seeing, Shira Sanders, in consultation for Charles Ku MD regarding acute appendicitis.  51-year-old lady with past medical history significant for cirrhosis, chronic kidney disease, diabetes mellitus presents with 2 days worth of worsening abdominal pain.  Her initial complaint was that of chest pain radiating through to her back which has now localized in her belly.  She complains of nausea though she denies bilious or bloody vomiting.  She has had no significant change in bowel habits with diarrhea and/or constipation.  Her pain in maximal intensity is approximately 8 out of 10.  She follows with Dr. Arturo De Leon with gastroenterology for her cirrhosis.  Otherwise are no significant modifying factors.  She denies fever and/or recent sick contacts.    Problems Addressed this Visit     None      Diagnoses    None.         Past Medical History:   Diagnosis Date   • Arthritis    • Chronic kidney disease    • Cirrhosis (CMS/HCC)    • Constipation    • Diabetes mellitus (CMS/HCC)    • Disease of thyroid gland    • GERD (gastroesophageal reflux disease)    • Low back pain        Past Surgical History:   • COLONOSCOPY   • HYSTERECTOMY   • LIVER BIOPSY       Allergies   Allergen Reactions   • Aspirin Other (See Comments)     Due to platelet issues was told not to take   • Chlorhexidine Rash   • Penicillins Nausea And Vomiting   • Phenergan [Promethazine Hcl] Nausea And Vomiting   • Tramadol Hcl Nausea And Vomiting       No current facility-administered medications on file prior to encounter.     Current Outpatient Medications on File Prior to Encounter   Medication Sig Dispense Refill   • Acetylcysteine (N-ACETYL-L-CYSTEINE PO) Take 800 mg by mouth.     •  Dexlansoprazole (DEXILANT PO) Take 1 tablet by mouth Daily.     • Empagliflozin (Jardiance) 10 MG tablet Take 10 mg by mouth Daily.     • gabapentin (NEURONTIN) 300 MG capsule Take 600 mg by mouth every night at bedtime. And may take 1 during the day prn  2   • insulin aspart (novoLOG) 100 UNIT/ML injection Inject  under the skin into the appropriate area as directed 3 (Three) Times a Day Before Meals. Sliding scale is taking approx 16 units before meals, blood sugars funning 300-400 for last 3 months     • Insulin Glargine (BASAGLAR KWIKPEN) 100 UNIT/ML injection pen Inject 66 Units under the skin into the appropriate area as directed Daily.     • levothyroxine (SYNTHROID, LEVOTHROID) 150 MCG tablet Take 150 mcg by mouth Daily.  1   • propranolol (INDERAL) 20 MG tablet Take 20 mg by mouth Daily.     • Simethicone (GAS RELIEF 80 PO) Take  by mouth.     • LEVEMIR 100 UNIT/ML injection Inject 40 Units under the skin into the appropriate area as directed 2 (Two) Times a Day.  5   • mupirocin (BACTROBAN) 2 % ointment Apply 1 application topically to the appropriate area as directed 2 (Two) Times a Day As Needed.  1   • pantoprazole (PROTONIX) 20 MG EC tablet Take 20 mg by mouth Daily.     • rifaximin (XIFAXAN) 550 MG tablet Take 550 mg by mouth Daily.           Current Facility-Administered Medications:   •  nicotine (NICODERM CQ) 21 MG/24HR patch 1 patch, 1 patch, Transdermal, Q24H, Marcia Tracey PA-C, 1 patch at 06/03/21 2305  •  sodium chloride 0.9 % flush 10 mL, 10 mL, Intravenous, PRN, Charles Ku MD  •  [COMPLETED] Insert peripheral IV, , , Once **AND** sodium chloride 0.9 % flush 10 mL, 10 mL, Intravenous, PRN, Marcia Tracey PA-C    Current Outpatient Medications:   •  Acetylcysteine (N-ACETYL-L-CYSTEINE PO), Take 800 mg by mouth., Disp: , Rfl:   •  Dexlansoprazole (DEXILANT PO), Take 1 tablet by mouth Daily., Disp: , Rfl:   •  Empagliflozin (Jardiance) 10 MG tablet, Take 10 mg by mouth Daily., Disp: ,  Rfl:   •  gabapentin (NEURONTIN) 300 MG capsule, Take 600 mg by mouth every night at bedtime. And may take 1 during the day prn, Disp: , Rfl: 2  •  insulin aspart (novoLOG) 100 UNIT/ML injection, Inject  under the skin into the appropriate area as directed 3 (Three) Times a Day Before Meals. Sliding scale is taking approx 16 units before meals, blood sugars funning 300-400 for last 3 months, Disp: , Rfl:   •  Insulin Glargine (BASAGLAR KWIKPEN) 100 UNIT/ML injection pen, Inject 66 Units under the skin into the appropriate area as directed Daily., Disp: , Rfl:   •  levothyroxine (SYNTHROID, LEVOTHROID) 150 MCG tablet, Take 150 mcg by mouth Daily., Disp: , Rfl: 1  •  propranolol (INDERAL) 20 MG tablet, Take 20 mg by mouth Daily., Disp: , Rfl:   •  Simethicone (GAS RELIEF 80 PO), Take  by mouth., Disp: , Rfl:   •  LEVEMIR 100 UNIT/ML injection, Inject 40 Units under the skin into the appropriate area as directed 2 (Two) Times a Day., Disp: , Rfl: 5  •  mupirocin (BACTROBAN) 2 % ointment, Apply 1 application topically to the appropriate area as directed 2 (Two) Times a Day As Needed., Disp: , Rfl: 1  •  pantoprazole (PROTONIX) 20 MG EC tablet, Take 20 mg by mouth Daily., Disp: , Rfl:   •  rifaximin (XIFAXAN) 550 MG tablet, Take 550 mg by mouth Daily., Disp: , Rfl:     Family History   Problem Relation Age of Onset   • Heart failure Mother    • Heart attack Mother 40   • Heart failure Maternal Grandmother      Social History     Socioeconomic History   • Marital status: Legally      Spouse name: Not on file   • Number of children: Not on file   • Years of education: Not on file   • Highest education level: Not on file   Tobacco Use   • Smoking status: Current Every Day Smoker     Packs/day: 0.50     Types: Cigarettes   • Smokeless tobacco: Never Used   Substance and Sexual Activity   • Alcohol use: No   • Drug use: No   • Sexual activity: Defer       Review of Systems:  Review of Systems   Constitutional:  "Positive for appetite change and chills. Negative for fatigue.   HENT: Negative for drooling, hearing loss and rhinorrhea.    Eyes: Negative for photophobia and visual disturbance.   Respiratory: Negative for choking and shortness of breath.    Cardiovascular: Negative for palpitations and leg swelling.   Gastrointestinal: Positive for abdominal pain and nausea. Negative for diarrhea.   Endocrine: Negative for polyphagia and polyuria.   Genitourinary: Negative for dysuria, frequency and hematuria.   Musculoskeletal: Positive for back pain. Negative for arthralgias and joint swelling.   Skin: Negative for pallor and rash.   Allergic/Immunologic: Negative for immunocompromised state.   Neurological: Negative for tremors, facial asymmetry and numbness.   Hematological: Negative for adenopathy.   Psychiatric/Behavioral: Negative for agitation and decreased concentration. The patient is not hyperactive.      Otherwise the 12 point review of systems is negative.    /63   Pulse 99   Temp 99.1 °F (37.3 °C)   Resp 20   Ht 160 cm (63\")   Wt 75.8 kg (167 lb)   SpO2 99%   BMI 29.58 kg/m²   Body mass index is 29.58 kg/m².    General: Laying on the gurney in moderate distress  HEENT: PER, no icterus, normal sclerae  Cardiac: regular rhythm, tachycardic,  no audible rubs  Pulmonary: bilateral breath sounds, non labored  Abdominal: Distended, diffusely tender, no palpable hepatosplenomegaly  Neurologic: awake, alert, no obvious focal deficits  Extremities: warm, no edema  Skin: no obvious rashes nor worrisome lesions seen     CBC  Results from last 7 days   Lab Units 06/03/21  1805   WBC 10*3/mm3 3.59   HEMOGLOBIN g/dL 8.5*   HEMATOCRIT % 25.8*   PLATELETS 10*3/mm3 83*       CMP  Results from last 7 days   Lab Units 06/03/21  1805   SODIUM mmol/L 138   POTASSIUM mmol/L 4.4   CHLORIDE mmol/L 110*   CO2 mmol/L 20.0*   BUN mg/dL 26*   CREATININE mg/dL 1.46*   CALCIUM mg/dL 9.0   BILIRUBIN mg/dL 0.7   ALK PHOS U/L 295* "   ALT (SGPT) U/L 24   AST (SGOT) U/L 45*   GLUCOSE mg/dL 106*       Radiology  Imaging Results (Last 72 Hours)     Procedure Component Value Units Date/Time    CT Chest Without Contrast Diagnostic [[id number]] Collected: 06/03/21 2138     Updated: 06/03/21 2140    Narrative:      CT Chest WO, CT Abdomen Pelvis WO    INDICATION:   Concern for pneumonia, history of smoking, upper abdominal pain, nausea and vomiting    TECHNIQUE:   CT of the thorax, abdomen and pelvis without IV contrast. Coronal and sagittal reconstructions were obtained.  Radiation dose reduction techniques included automated exposure control or exposure modulation based on body size. Count of known CT and  cardiac nuc med studies performed in previous 12 months: 0.     COMPARISON:   CT of the abdomen and pelvis. 4/27/2019     FINDINGS:  Chest:  The lungs are clear. There is no evidence of consolidation/infiltrate, effusion or pneumothorax. Cardiac structure and mediastinum appear within normal limits part from coronary artery calcification.. No acute osseous abnormality.    Abdomen:  There are gallstones. There appears to be some potential gallbladder wall thickening although this is somewhat indeterminate. The patient's appendix is dilated up to 12 mm and there is associated fat stranding and fluid. This has increased in diameter  from the prior CT. There is also increased intraluminal fluid. There is cirrhosis with a small amount of fluid around the liver. The spleen is enlarged. Kidneys are unremarkable. No definite pancreatic abnormality.    Pelvis: The patient's urinary bladder is distended. Bowel appears unremarkable. There is some free fluid in the pelvis. No acute osseous abnormality.      Impression:      1. Exam is limited due to lack of intravenous contrast. The appendix is now fluid-filled with surrounding fat stranding and has increased in dilation from prior. Findings are concerning for potential acute appendicitis although this is  somewhat  indeterminate. Finding was discussed with Dr. Tracey at time of dictation.  2. There is cirrhosis with ascites.  3. There are gallstones with potential gallbladder wall thickening and pericholecystic fluid. This may be accentuated due to ascites.  4. The patient's urinary bladder is distended. If the patient is not able to void spontaneously, consider catheterization.    Signer Name: Cherelle Song MD   Signed: 6/3/2021 9:38 PM   Workstation Name: [id number]    [facility]    CT Abdomen Pelvis Without Contrast [[id number]] Collected: 06/03/21 2138     Updated: 06/03/21 2140    Narrative:      CT Chest WO, CT Abdomen Pelvis WO    INDICATION:   Concern for pneumonia, history of smoking, upper abdominal pain, nausea and vomiting    TECHNIQUE:   CT of the thorax, abdomen and pelvis without IV contrast. Coronal and sagittal reconstructions were obtained.  Radiation dose reduction techniques included automated exposure control or exposure modulation based on body size. Count of known CT and  cardiac nuc med studies performed in previous 12 months: 0.     COMPARISON:   CT of the abdomen and pelvis. 4/27/2019     FINDINGS:  Chest:  The lungs are clear. There is no evidence of consolidation/infiltrate, effusion or pneumothorax. Cardiac structure and mediastinum appear within normal limits part from coronary artery calcification.. No acute osseous abnormality.    Abdomen:  There are gallstones. There appears to be some potential gallbladder wall thickening although this is somewhat indeterminate. The patient's appendix is dilated up to 12 mm and there is associated fat stranding and fluid. This has increased in diameter  from the prior CT. There is also increased intraluminal fluid. There is cirrhosis with a small amount of fluid around the liver. The spleen is enlarged. Kidneys are unremarkable. No definite pancreatic abnormality.    Pelvis: The patient's urinary bladder is distended. Bowel appears  unremarkable. There is some free fluid in the pelvis. No acute osseous abnormality.      Impression:      1. Exam is limited due to lack of intravenous contrast. The appendix is now fluid-filled with surrounding fat stranding and has increased in dilation from prior. Findings are concerning for potential acute appendicitis although this is somewhat  indeterminate. Finding was discussed with Dr. Tracey at time of dictation.  2. There is cirrhosis with ascites.  3. There are gallstones with potential gallbladder wall thickening and pericholecystic fluid. This may be accentuated due to ascites.  4. The patient's urinary bladder is distended. If the patient is not able to void spontaneously, consider catheterization.    Signer Name: Cherelle Song MD   Signed: 6/3/2021 9:38 PM   Workstation Name: [id number]    [facility]    XR Chest 1 View [[id number]] Collected: 06/03/21 1838     Updated: 06/03/21 1842    Narrative:      EXAMINATION: XR CHEST 1 VW-      INDICATION: Chest Pain triage protocol      COMPARISON: 4/27/2019     FINDINGS: No focal airspace opacity. No pleural effusion or  pneumothorax. Normal heart and mediastinal contours.       Impression:      No evidence of acute disease in the chest.      This report was finalized on 6/3/2021 6:38 PM by Michael Serrano.               Assessment:  Acute appendicitis  Cirrhosis with ascites, portal hypertension, and thrombocytopenia  Chronic kidney disease  Diabetes mellitus    Plan:  From a surgical perspective this complex patient with significant portal hypertension, ascites, thrombocytopenia, and associated chronic kidney disease will be better cared for at a institution able to treat significant portal hypertension and ascites should it be needed with TIPS etc [facility].  At this point, I think she does have acute appendicitis.  I reviewed the CT scan of the abdomen and pelvis.  I discussed my recommendations with Dr. Ku in  the emergency room and also spoke directly to Dr. Casiano at the [facility] (as the [facility] is on diversion).  He is willing to accept the patient.  His only request was to have the images of the CT abdomen and pelvis burned onto a disc and sent with the patient.    Graeme Nelson MD  06/04/21  00:54 EDT

## 2021-06-06 LAB
QT INTERVAL: 348 MS
QTC INTERVAL: 448 MS

## 2021-06-09 LAB
BACTERIA SPEC AEROBE CULT: NORMAL
BACTERIA SPEC AEROBE CULT: NORMAL

## 2021-06-11 LAB — VIT B2 BLD-MCNC: 296 UG/L (ref 137–370)

## 2021-06-16 LAB
QT INTERVAL: 360 MS
QTC INTERVAL: 415 MS
